# Patient Record
Sex: FEMALE | Race: WHITE | NOT HISPANIC OR LATINO | Employment: OTHER | URBAN - METROPOLITAN AREA
[De-identification: names, ages, dates, MRNs, and addresses within clinical notes are randomized per-mention and may not be internally consistent; named-entity substitution may affect disease eponyms.]

---

## 2017-09-20 ENCOUNTER — OFFICE VISIT (OUTPATIENT)
Dept: CARDIOLOGY | Facility: CLINIC | Age: 67
End: 2017-09-20

## 2017-09-20 VITALS
HEART RATE: 65 BPM | WEIGHT: 196 LBS | DIASTOLIC BLOOD PRESSURE: 90 MMHG | SYSTOLIC BLOOD PRESSURE: 162 MMHG | HEIGHT: 65 IN | BODY MASS INDEX: 32.65 KG/M2

## 2017-09-20 DIAGNOSIS — E78.2 MIXED HYPERLIPIDEMIA: ICD-10-CM

## 2017-09-20 DIAGNOSIS — I10 ESSENTIAL HYPERTENSION: ICD-10-CM

## 2017-09-20 DIAGNOSIS — I20.0 PROGRESSIVE ANGINA (HCC): Primary | ICD-10-CM

## 2017-09-20 PROCEDURE — 93000 ELECTROCARDIOGRAM COMPLETE: CPT | Performed by: INTERNAL MEDICINE

## 2017-09-20 PROCEDURE — 99204 OFFICE O/P NEW MOD 45 MIN: CPT | Performed by: INTERNAL MEDICINE

## 2017-09-20 RX ORDER — EZETIMIBE 10 MG/1
TABLET ORAL DAILY
Refills: 2 | COMMUNITY
Start: 2017-08-14 | End: 2017-09-20

## 2017-09-20 RX ORDER — LEVOTHYROXINE SODIUM 0.12 MG/1
112 TABLET ORAL DAILY
Refills: 3 | COMMUNITY
Start: 2017-08-24 | End: 2022-05-04 | Stop reason: SDDI

## 2017-09-20 RX ORDER — SIMVASTATIN 40 MG
TABLET ORAL DAILY
Refills: 1 | COMMUNITY
Start: 2017-07-07 | End: 2017-09-20

## 2017-09-20 RX ORDER — RAMIPRIL 10 MG/1
20 CAPSULE ORAL DAILY
Refills: 1 | COMMUNITY
Start: 2017-06-16

## 2017-09-20 RX ORDER — ATORVASTATIN CALCIUM 80 MG/1
80 TABLET, FILM COATED ORAL DAILY
Qty: 90 TABLET | Refills: 3 | Status: SHIPPED | OUTPATIENT
Start: 2017-09-20 | End: 2018-05-27 | Stop reason: SDUPTHER

## 2017-09-20 RX ORDER — METOPROLOL SUCCINATE 100 MG/1
100 TABLET, EXTENDED RELEASE ORAL DAILY
Refills: 1 | COMMUNITY
Start: 2017-07-07

## 2017-09-20 RX ORDER — CHOLECALCIFEROL (VITAMIN D3) 125 MCG
2000 CAPSULE ORAL DAILY
COMMUNITY

## 2017-09-20 RX ORDER — OMEPRAZOLE 20 MG/1
CAPSULE, DELAYED RELEASE ORAL DAILY
Refills: 3 | COMMUNITY
Start: 2017-06-28 | End: 2019-10-02

## 2017-09-20 RX ORDER — HYDROCHLOROTHIAZIDE 25 MG/1
25 TABLET ORAL DAILY
Refills: 1 | COMMUNITY
Start: 2017-06-16

## 2017-09-20 RX ORDER — AMOXICILLIN 500 MG
1200 CAPSULE ORAL DAILY
COMMUNITY

## 2017-09-20 RX ORDER — PAROXETINE HYDROCHLORIDE 40 MG/1
40 TABLET, FILM COATED ORAL EVERY MORNING
Refills: 1 | COMMUNITY
Start: 2017-08-23 | End: 2022-05-04

## 2017-09-20 RX ORDER — ASPIRIN 81 MG/1
81 TABLET ORAL DAILY
COMMUNITY

## 2017-09-20 NOTE — PROGRESS NOTES
Encounter Date:09/20/2017    Location: [place]    Juan Ramon Barroso MD    Patient ID: Cathi Walker is a 67 y.o. female; resident of [place], [place]    1950  Subjective:      Chief Complaint/Reason for visit:    Chief Complaint   Patient presents with   • Irregular Heart Beat     Consult   • Dizziness       Problem List:  1.  Chest pain   A.  Nuclear Stress Test 2014:  Normal per patient; data insufficient  2.  Mitral Valve Prolapse   A.  Echocardiogram 2014: data deficit  3.  Hypertension  4.  Hyperlipidemia  5.  Carotid Disease   A.  Carotid Duplex 2014, [place]:  1-49% stenosis on right; normal antegrade flow on left  6.  Thyroid Disorder  7.  GERD  8.  LENY with CPAP  9.  Surgeries:   A.  Cholecystectomy      HPI:  Patient is a pleasant 67-year-old white female with the above-noted medical history presents today in consultation for intermittent chest pain and dizziness.  She states that she has begun noticing over the past several months that when she is doing routine how sure such as mopping or sweeping the floors, that she develops left-sided chest pain that is intermittent in nature.  She states that it does go away with rest and last only for a few minutes.  There is no radiation of her pain and no other associated symptoms.  She also admits to intermittent dizziness that she attributes to rapid position changes.  She does have a history of mitral valve prolapse with palpitations but states that these have been under adequate control since being on a beta blocker.  She has a significantly strong family history of coronary artery disease with her father having his first MI in his 40s.  He is still living at the age of 88 and currently resides with her.  She has several other cardiac risk factors including hypertension, hyperlipidemia, and carotid disease diagnosed in 2014.  She also admits to having an increase in fatigue over the past several months.  She has sleep apnea and wears her CPAP faithfully  every night.  Patient does have a known history of hyperlipidemia currently being treated with simvastatin and 30.  Her current LDL was at 143 in June 2017.  Due to carotid disease and her strong family history of coronary disease, we feel that this should be treated more aggressively and will change her to a high dose statin therapy.    Cardiac ROS:  Positive for chest pain, shortness of breath, dyspnea on exertion, fatigue, palpitations, dizziness and snoring/ LENY with CPAP.  Negative for orthopnea, PND, edema, pre-syncope/ syncope    Cardiac Risk Factors: advanced age (older than 55 for men, 65 for women), dyslipidemia, hypertension, obesity (BMI >= 30 kg/m2), sedentary lifestyle and smoking/ tobacco exposure  Social History     Social History   • Marital status:      Spouse name: N/A   • Number of children: N/A   • Years of education: N/A     Occupational History   • Not on file.     Social History Main Topics   • Smoking status: Former Smoker     Quit date: 2000   • Smokeless tobacco: Never Used   • Alcohol use Not on file      Comment: occas   • Drug use: No   • Sexual activity: Defer     Other Topics Concern   • Not on file     Social History Narrative   • No narrative on file       family history includes Heart disease in her father; Heart failure in her father; Hyperlipidemia in her brother, father, mother, and sister; Hypertension in her brother, father, mother, and sister; Stroke in her mother.     has a past medical history of Acid reflux; Anxiety; Chicken pox; Hyperlipidemia; Hypertension; Measles; Menopause; and Mitral valve prolapse.    Allergies   Allergen Reactions   • Penicillins Hives         Current Outpatient Prescriptions:   •  aspirin 81 MG EC tablet, Take 81 mg by mouth Daily., Disp: , Rfl:   •  Cholecalciferol (VITAMIN D3) 2000 units tablet, Take  by mouth Daily., Disp: , Rfl:   •  GLUCOSAMINE SULFATE PO, Take 2,000 mg by mouth Daily., Disp: , Rfl:   •  hydrochlorothiazide  (HYDRODIURIL) 25 MG tablet, Daily., Disp: , Rfl: 1  •  levothyroxine (SYNTHROID, LEVOTHROID) 100 MCG tablet, Daily., Disp: , Rfl: 3  •  metoprolol succinate XL (TOPROL-XL) 50 MG 24 hr tablet, Daily., Disp: , Rfl: 1  •  Omega-3 Fatty Acids (FISH OIL) 1200 MG capsule capsule, Take 1,200 mg by mouth Daily., Disp: , Rfl:   •  omeprazole (priLOSEC) 20 MG capsule, Daily., Disp: , Rfl: 3  •  PARoxetine (PAXIL) 20 MG tablet, Daily., Disp: , Rfl: 1  •  ramipril (ALTACE) 10 MG capsule, Daily., Disp: , Rfl: 1  •  ZETIA 10 MG tablet, Daily., Disp: , Rfl: 2  •  atorvastatin (LIPITOR) 80 MG tablet, Take 1 tablet by mouth Daily., Disp: 90 tablet, Rfl: 3    Review of Systems   Constitution: Positive for malaise/fatigue.   HENT: Negative.    Eyes: Negative.         Wears glasses   Cardiovascular: Positive for chest pain, dyspnea on exertion and palpitations. Negative for near-syncope, orthopnea, paroxysmal nocturnal dyspnea and syncope.   Respiratory: Positive for sleep disturbances due to breathing and snoring. Negative for cough, hemoptysis, shortness of breath, sputum production and wheezing.    Endocrine: Negative.    Hematologic/Lymphatic: Negative.    Skin: Negative.    Musculoskeletal: Negative.    Gastrointestinal: Positive for heartburn.   Genitourinary: Negative.    Neurological: Positive for dizziness (intermittent with rapid postion changes).   Psychiatric/Behavioral: Negative.    Allergic/Immunologic: Negative.        Vitals:    09/20/17 0934   BP: 162/90   Pulse: 65          Objective:       Physical Exam   Constitutional: She is oriented to person, place, and time. She appears well-developed and well-nourished.   HENT:   Head: Normocephalic and atraumatic.   Eyes: Right eye exhibits no discharge. Left eye exhibits no discharge.   Neck: Normal range of motion. Neck supple. No JVD present.   Cardiovascular: Normal rate, regular rhythm, normal heart sounds and intact distal pulses.  Exam reveals no gallop and no friction  rub.    No murmur heard.  Pulmonary/Chest: Effort normal and breath sounds normal. She has no wheezes.   Abdominal: Soft. Bowel sounds are normal. There is no tenderness.   Musculoskeletal: Normal range of motion. She exhibits no edema.   Neurological: She is alert and oriented to person, place, and time.   Skin: Skin is warm and dry.   Psychiatric: She has a normal mood and affect. Her behavior is normal.       Data Review:     ECG 12 Lead  Date/Time: 9/20/2017 10:21 AM  Performed by: ZOFIA GRIFFITH  Authorized by: ZOFIA GRIFFITH   Rhythm: sinus rhythm  Rate: normal  BPM: 80  Clinical impression: normal ECG        Assessment:      ICD-10-CM ICD-9-CM   1. Progressive angina I20.0 411.1   2. Mixed hyperlipidemia E78.2 272.2   3. Essential hypertension I10 401.9       Plan:  1.  Stress Echocardiogram  2.  Discontinue simvastatin and zetia; switch to Lipitor 80mg daily  3.  Recheck labs in 3 months  4.  Continue all other medications as directed  5.  F/up in 12  months or sooner if needed.        Scribed for Emilie Clarke MD by RAYRAY Crowe. 9/20/2017  1:05 PM     I Emilie Clarke MD personally performed the services described in this documentation as scribed by the above individual in my presence, and it is both accurate and complete.    Emilie Clarke MD, [facility]

## 2017-09-27 ENCOUNTER — HOSPITAL ENCOUNTER (OUTPATIENT)
Dept: CARDIOLOGY | Facility: HOSPITAL | Age: 67
Discharge: HOME | End: 2017-09-27
Admitting: NURSE PRACTITIONER

## 2017-09-27 VITALS — BODY MASS INDEX: 32.65 KG/M2 | HEIGHT: 65 IN | WEIGHT: 196 LBS | HEART RATE: 79 BPM

## 2017-09-27 DIAGNOSIS — I20.0 PROGRESSIVE ANGINA (HCC): ICD-10-CM

## 2017-09-27 PROCEDURE — 93352 ADMIN ECG CONTRAST AGENT: CPT | Performed by: INTERNAL MEDICINE

## 2017-09-27 PROCEDURE — 25010000002 SULFUR HEXAFLUORIDE MICROSPH 60.7-25 MG RECONSTITUTED SUSPENSION

## 2017-09-27 PROCEDURE — 93325 DOPPLER ECHO COLOR FLOW MAPG: CPT | Performed by: INTERNAL MEDICINE

## 2017-09-27 PROCEDURE — 93320 DOPPLER ECHO COMPLETE: CPT

## 2017-09-27 PROCEDURE — 93350 STRESS TTE ONLY: CPT | Performed by: INTERNAL MEDICINE

## 2017-09-27 PROCEDURE — 93018 CV STRESS TEST I&R ONLY: CPT | Performed by: INTERNAL MEDICINE

## 2017-09-27 PROCEDURE — 25010000002 SULFUR HEXAFLUORIDE MICROSPH 60.7-25 MG RECONSTITUTED SUSPENSION: Performed by: NURSE PRACTITIONER

## 2017-09-27 PROCEDURE — 93325 DOPPLER ECHO COLOR FLOW MAPG: CPT

## 2017-09-27 PROCEDURE — 93350 STRESS TTE ONLY: CPT

## 2017-09-27 PROCEDURE — 93320 DOPPLER ECHO COMPLETE: CPT | Performed by: INTERNAL MEDICINE

## 2017-09-27 PROCEDURE — 93017 CV STRESS TEST TRACING ONLY: CPT

## 2017-09-27 RX ADMIN — SULFUR HEXAFLUORIDE 4 ML: KIT at 14:02

## 2017-10-02 LAB
BH CV ECHO MEAS - AO MAX PG (FULL): 3.2 MMHG
BH CV ECHO MEAS - AO MAX PG: 8.1 MMHG
BH CV ECHO MEAS - AO MEAN PG (FULL): 2 MMHG
BH CV ECHO MEAS - AO MEAN PG: 5 MMHG
BH CV ECHO MEAS - AO ROOT AREA (BSA CORRECTED): 1.4
BH CV ECHO MEAS - AO ROOT AREA: 5.9 CM^2
BH CV ECHO MEAS - AO ROOT DIAM: 2.8 CM
BH CV ECHO MEAS - AO V2 MAX: 142 CM/SEC
BH CV ECHO MEAS - AO V2 MEAN: 101 CM/SEC
BH CV ECHO MEAS - AO V2 VTI: 34.4 CM
BH CV ECHO MEAS - ASC AORTA: 3 CM
BH CV ECHO MEAS - AVA(I,A): 2.2 CM^2
BH CV ECHO MEAS - AVA(I,D): 2.2 CM^2
BH CV ECHO MEAS - AVA(V,A): 2.4 CM^2
BH CV ECHO MEAS - AVA(V,D): 2.4 CM^2
BH CV ECHO MEAS - BSA(HAYCOCK): 2.1 M^2
BH CV ECHO MEAS - BSA: 2 M^2
BH CV ECHO MEAS - BZI_BMI: 32.6 KILOGRAMS/M^2
BH CV ECHO MEAS - BZI_METRIC_HEIGHT: 165.1 CM
BH CV ECHO MEAS - BZI_METRIC_WEIGHT: 88.9 KG
BH CV ECHO MEAS - CONTRAST EF 4CH: 67.3 ML/M^2
BH CV ECHO MEAS - EDV(CUBED): 87.5 ML
BH CV ECHO MEAS - EDV(MOD-SP4): 52 ML
BH CV ECHO MEAS - EDV(TEICH): 89.6 ML
BH CV ECHO MEAS - EF(CUBED): 80.8 %
BH CV ECHO MEAS - EF(MOD-SP4): 67.3 %
BH CV ECHO MEAS - EF(TEICH): 73.6 %
BH CV ECHO MEAS - ESV(CUBED): 16.8 ML
BH CV ECHO MEAS - ESV(MOD-SP4): 17 ML
BH CV ECHO MEAS - ESV(TEICH): 23.7 ML
BH CV ECHO MEAS - FS: 42.3 %
BH CV ECHO MEAS - IVS/LVPW: 0.65
BH CV ECHO MEAS - IVSD: 0.73 CM
BH CV ECHO MEAS - LA DIMENSION: 3.6 CM
BH CV ECHO MEAS - LA/AO: 1.3
BH CV ECHO MEAS - LAT PEAK E' VEL: 7.4 CM/SEC
BH CV ECHO MEAS - LV DIASTOLIC VOL/BSA (35-75): 26.5 ML/M^2
BH CV ECHO MEAS - LV MASS(C)D: 134.6 GRAMS
BH CV ECHO MEAS - LV MASS(C)DI: 68.6 GRAMS/M^2
BH CV ECHO MEAS - LV MAX PG: 4.8 MMHG
BH CV ECHO MEAS - LV MEAN PG: 3 MMHG
BH CV ECHO MEAS - LV SYSTOLIC VOL/BSA (12-30): 8.7 ML/M^2
BH CV ECHO MEAS - LV V1 MAX: 110 CM/SEC
BH CV ECHO MEAS - LV V1 MEAN: 79.1 CM/SEC
BH CV ECHO MEAS - LV V1 VTI: 23.6 CM
BH CV ECHO MEAS - LVIDD: 4.4 CM
BH CV ECHO MEAS - LVIDS: 2.6 CM
BH CV ECHO MEAS - LVLD AP4: 6.9 CM
BH CV ECHO MEAS - LVLS AP4: 5.9 CM
BH CV ECHO MEAS - LVOT AREA (M): 3.1 CM^2
BH CV ECHO MEAS - LVOT AREA: 3.1 CM^2
BH CV ECHO MEAS - LVOT DIAM: 2 CM
BH CV ECHO MEAS - LVPWD: 1 CM
BH CV ECHO MEAS - MED PEAK E' VEL: 5.92 CM/SEC
BH CV ECHO MEAS - MV A MAX VEL: 85.4 CM/SEC
BH CV ECHO MEAS - MV DEC TIME: 0.13 SEC
BH CV ECHO MEAS - MV E MAX VEL: 78 CM/SEC
BH CV ECHO MEAS - MV E/A: 0.91
BH CV ECHO MEAS - PA ACC SLOPE: 741.5 CM/SEC^2
BH CV ECHO MEAS - PA ACC TIME: 0.12 SEC
BH CV ECHO MEAS - PA MAX PG: 5 MMHG
BH CV ECHO MEAS - PA PR(ACCEL): 24.3 MMHG
BH CV ECHO MEAS - PA V2 MAX: 111 CM/SEC
BH CV ECHO MEAS - RVDD: 2.2 CM
BH CV ECHO MEAS - SI(AO): 104.2 ML/M^2
BH CV ECHO MEAS - SI(CUBED): 36.1 ML/M^2
BH CV ECHO MEAS - SI(LVOT): 37.8 ML/M^2
BH CV ECHO MEAS - SI(MOD-SP4): 17.8 ML/M^2
BH CV ECHO MEAS - SI(TEICH): 33.6 ML/M^2
BH CV ECHO MEAS - SV(AO): 204.3 ML
BH CV ECHO MEAS - SV(CUBED): 70.8 ML
BH CV ECHO MEAS - SV(LVOT): 74.1 ML
BH CV ECHO MEAS - SV(MOD-SP4): 35 ML
BH CV ECHO MEAS - SV(TEICH): 65.9 ML
BH CV ECHO MEAS - TAPSE (>1.6): 2.2 CM2
BH CV ECHO MEAS - TV MAX PG: 1.5 MMHG
BH CV ECHO MEAS - TV V2 MAX: 61.2 CM/SEC
BH CV STRESS BP STAGE 1: NORMAL
BH CV STRESS BP STAGE 2: NORMAL
BH CV STRESS COMMENTS STAGE 1: NORMAL
BH CV STRESS DURATION MIN STAGE 1: 3
BH CV STRESS DURATION MIN STAGE 2: 3
BH CV STRESS DURATION MIN STAGE 3: 1
BH CV STRESS DURATION SEC STAGE 1: 0
BH CV STRESS DURATION SEC STAGE 2: 0
BH CV STRESS DURATION SEC STAGE 3: 9
BH CV STRESS GRADE STAGE 1: 10
BH CV STRESS GRADE STAGE 2: 12
BH CV STRESS GRADE STAGE 3: 14
BH CV STRESS HR STAGE 1: 134
BH CV STRESS HR STAGE 2: 155
BH CV STRESS HR STAGE 3: 162
BH CV STRESS METS STAGE 1: 5
BH CV STRESS METS STAGE 2: 7.5
BH CV STRESS METS STAGE 3: 10
BH CV STRESS PROTOCOL 1: NORMAL
BH CV STRESS RECOVERY BP: NORMAL MMHG
BH CV STRESS RECOVERY HR: 97 BPM
BH CV STRESS SPEED STAGE 1: 1.7
BH CV STRESS SPEED STAGE 2: 2.5
BH CV STRESS SPEED STAGE 3: 3.4
BH CV STRESS STAGE 1: 1
BH CV STRESS STAGE 2: 2
BH CV STRESS STAGE 3: 3
BH CV XLRA - RV BASE: 2.6 CM
BH CV XLRA - RV LENGTH: 5.4 CM
BH CV XLRA - RV MID: 2.4 CM
LV EF 2D ECHO EST: 60 %
MAXIMAL PREDICTED HEART RATE: 155 BPM
PERCENT MAX PREDICTED HR: 104.52 %
STRESS BASELINE BP: NORMAL MMHG
STRESS BASELINE HR: 85 BPM
STRESS PERCENT HR: 123 %
STRESS POST ESTIMATED WORKLOAD: 10.1 METS
STRESS POST EXERCISE DUR MIN: 7 MIN
STRESS POST EXERCISE DUR SEC: 9 SEC
STRESS POST PEAK BP: NORMAL MMHG
STRESS POST PEAK HR: 162 BPM
STRESS TARGET HR: 132 BPM

## 2018-05-29 RX ORDER — ATORVASTATIN CALCIUM 80 MG/1
80 TABLET, FILM COATED ORAL DAILY
Qty: 90 TABLET | Refills: 0 | Status: SHIPPED | OUTPATIENT
Start: 2018-05-29 | End: 2018-11-15 | Stop reason: SDUPTHER

## 2018-09-26 ENCOUNTER — OFFICE VISIT (OUTPATIENT)
Dept: CARDIOLOGY | Facility: CLINIC | Age: 68
End: 2018-09-26

## 2018-09-26 VITALS
SYSTOLIC BLOOD PRESSURE: 134 MMHG | HEIGHT: 65 IN | WEIGHT: 201 LBS | HEART RATE: 74 BPM | BODY MASS INDEX: 33.49 KG/M2 | DIASTOLIC BLOOD PRESSURE: 72 MMHG

## 2018-09-26 DIAGNOSIS — I10 ESSENTIAL HYPERTENSION: Primary | ICD-10-CM

## 2018-09-26 DIAGNOSIS — E78.2 MIXED HYPERLIPIDEMIA: ICD-10-CM

## 2018-09-26 PROCEDURE — 99213 OFFICE O/P EST LOW 20 MIN: CPT | Performed by: NURSE PRACTITIONER

## 2018-09-26 RX ORDER — DOXYCYCLINE HYCLATE 100 MG/1
100 CAPSULE ORAL 2 TIMES DAILY
COMMUNITY
End: 2019-10-02

## 2018-09-26 NOTE — PROGRESS NOTES
Cathi MIRAMONTES Denise  1950  [phone]      09/26/2018    Juan Ramon Barroso MD    Chief Complaint   Patient presents with   • Progressive angina (CMS/HCC)     Problem List  1.  Chest pain              A.  Nuclear Stress Test 2014:  Normal per patient; data insufficient   B.  Stress Echo 10/2/17:  EF 60%, trace MR/ TR.  Good exercise tolerance with borderline hypertensive response @ 200/80.  No ischemica.    2.  Mitral Valve Prolapse              A.  Echocardiogram 2014: data deficit  3.  Hypertension  4.  Hyperlipidemia  5.  Carotid Disease              A.  Carotid Duplex 2014, [place]:  1-49% stenosis on right; normal antegrade flow on left  6.  Thyroid Disorder  7.  GERD  8.  LENY with CPAP  9.  Surgeries:              A.  Cholecystectomy.    Allergies   Allergen Reactions   • Penicillins Hives       Current Medications    Current Outpatient Prescriptions:   •  aspirin 81 MG EC tablet, Take 81 mg by mouth Daily., Disp: , Rfl:   •  atorvastatin (LIPITOR) 80 MG tablet, Take 1 tablet by mouth Daily. Need lab work for further refills, Disp: 90 tablet, Rfl: 0  •  Cholecalciferol (VITAMIN D3) 2000 units tablet, Take 2,000 Units by mouth Daily., Disp: , Rfl:   •  doxycycline (VIBRAMYCIN) 100 MG capsule, Take 100 mg by mouth 2 (Two) Times a Day., Disp: , Rfl:   •  hydrochlorothiazide (HYDRODIURIL) 25 MG tablet, Take 25 mg by mouth Daily., Disp: , Rfl: 1  •  levothyroxine (SYNTHROID, LEVOTHROID) 100 MCG tablet, Daily., Disp: , Rfl: 3  •  metoprolol succinate XL (TOPROL-XL) 100 MG 24 hr tablet, Take 100 mg by mouth Daily., Disp: , Rfl: 1  •  Omega-3 Fatty Acids (FISH OIL) 1200 MG capsule capsule, Take 1,200 mg by mouth Daily., Disp: , Rfl:   •  omeprazole (priLOSEC) 20 MG capsule, Daily., Disp: , Rfl: 3  •  PARoxetine (PAXIL) 20 MG tablet, Take 20 mg by mouth As Needed., Disp: , Rfl: 1  •  ramipril (ALTACE) 10 MG capsule, Take 20 mg by mouth Daily., Disp: , Rfl: 1    History of Present Illness   HPI  Patient is a very  "pleasant 68-year-old female with the above-noted medical history presents for 6 month follow-up status post chest pain, hyperlipidemia and hypertension.  Since her last visit she has had a normal stress echocardiogram.  She is trying to remain active with enjoying spending time with her 5 grandchildren.  She is hoping to get back involved in a routine exercise regimen.  She denies having any current chest pain, shortness of breath, dyspnea on exertion, edema, fatigue, palpitations, dizziness and syncope.  She is scheduled to have her upcoming annual follow-up with her primary care physician and will have her lipids checked at that time.  She is going to monitor her blood pressures at home and keep a long period she has noticed that her blood pressure continues to trend upward gradually and is wondering if the medication regimen that she is currently taking as appropriate.  I'll was told her that there are options that we can try to condense the amount of medications that she currently is taking.  She also wonders if her recent weight gain is admitted with her blood pressures as well.  Overall, she is doing well from the cardiac standpoint.      The following portions of the patient's history were reviewed and updated as appropriate: allergies, current medications and problem list.    Pertinent positives as listed in the HPI.  All other systems reviewed are negative.    Vitals:    09/26/18 1453   BP: 134/72   BP Location: Right arm   Patient Position: Sitting   Pulse: 74   Weight: 91.2 kg (201 lb)   Height: 165.1 cm (65\")       Physical Exam  GENERAL: well-developed, well-nourished; in no acute distress.   NECK:  There is no jugular venous distention at 30°.  Carotid upstrokes are 2+ and  symmetrical without bruits.   LUNGS: Clear to auscultation bilaterally without wheezing, rhonchi, or rales noted.   CARDIOVASCULAR: The heart has a regular rate with a normal S1 and S2. There is no murmur, gallop, rub, or click " appreciated. The PMI is nondisplaced.   ABDOMEN: Soft and nontender  NEUROLOGICAL: Nonfocal; Alert and oriented  PERIPHERAL VASCULAR:  Posterior tibial pulses are 2+ and symmetrical. There is no peripheral edema.   SKIN:  Warm and dry  PSYCHIATRIC: normal mood and affect; behavior appropriate    Diagnostic Data:  Procedures    Assessment:      ICD-10-CM ICD-9-CM   1. Essential hypertension I10 401.9   2. Mixed hyperlipidemia E78.2 272.2       Plan:  Encouraged routine exercise and dietary modifications  Continue to monitor blood pressures and keep log  Continue current medications at this time  F/up in 12 months or sooner if needed.      Seen independently by RAYRAY Crowe on September 26, 2018 @ 5099

## 2018-11-15 RX ORDER — ATORVASTATIN CALCIUM 80 MG/1
80 TABLET, FILM COATED ORAL DAILY
Qty: 90 TABLET | Refills: 0 | Status: SHIPPED | OUTPATIENT
Start: 2018-11-15 | End: 2019-02-10 | Stop reason: SDUPTHER

## 2019-02-11 RX ORDER — ATORVASTATIN CALCIUM 80 MG/1
TABLET, FILM COATED ORAL
Qty: 90 TABLET | Refills: 2 | Status: SHIPPED | OUTPATIENT
Start: 2019-02-11 | End: 2019-12-06 | Stop reason: SDUPTHER

## 2019-10-02 ENCOUNTER — OFFICE VISIT (OUTPATIENT)
Dept: CARDIOLOGY | Facility: CLINIC | Age: 69
End: 2019-10-02

## 2019-10-02 VITALS
DIASTOLIC BLOOD PRESSURE: 64 MMHG | SYSTOLIC BLOOD PRESSURE: 124 MMHG | BODY MASS INDEX: 35.34 KG/M2 | WEIGHT: 207 LBS | HEIGHT: 64 IN | HEART RATE: 70 BPM

## 2019-10-02 DIAGNOSIS — E78.2 MIXED HYPERLIPIDEMIA: ICD-10-CM

## 2019-10-02 DIAGNOSIS — I65.21 STENOSIS OF RIGHT CAROTID ARTERY: ICD-10-CM

## 2019-10-02 DIAGNOSIS — I10 ESSENTIAL HYPERTENSION: Primary | ICD-10-CM

## 2019-10-02 PROCEDURE — 99213 OFFICE O/P EST LOW 20 MIN: CPT | Performed by: INTERNAL MEDICINE

## 2019-12-06 RX ORDER — ATORVASTATIN CALCIUM 80 MG/1
80 TABLET, FILM COATED ORAL DAILY
Qty: 90 TABLET | Refills: 0 | Status: SHIPPED | OUTPATIENT
Start: 2019-12-06 | End: 2020-03-05

## 2020-01-03 DIAGNOSIS — E78.2 MIXED HYPERLIPIDEMIA: Primary | ICD-10-CM

## 2020-03-05 RX ORDER — ATORVASTATIN CALCIUM 80 MG/1
TABLET, FILM COATED ORAL
Qty: 90 TABLET | Refills: 0 | Status: SHIPPED | OUTPATIENT
Start: 2020-03-05 | End: 2020-06-01

## 2020-06-01 RX ORDER — ATORVASTATIN CALCIUM 80 MG/1
80 TABLET, FILM COATED ORAL DAILY
Qty: 90 TABLET | Refills: 0 | Status: SHIPPED | OUTPATIENT
Start: 2020-06-01

## 2020-07-02 DIAGNOSIS — I65.21 STENOSIS OF RIGHT CAROTID ARTERY: Primary | ICD-10-CM

## 2020-10-08 ENCOUNTER — HOSPITAL ENCOUNTER (OUTPATIENT)
Dept: CARDIOLOGY | Facility: HOSPITAL | Age: 70
Discharge: HOME | End: 2020-10-08
Admitting: INTERNAL MEDICINE

## 2020-10-08 VITALS — BODY MASS INDEX: 36.68 KG/M2 | WEIGHT: 207 LBS | HEIGHT: 63 IN

## 2020-10-08 DIAGNOSIS — I65.21 STENOSIS OF RIGHT CAROTID ARTERY: ICD-10-CM

## 2020-10-08 PROCEDURE — 93880 EXTRACRANIAL BILAT STUDY: CPT

## 2020-10-08 PROCEDURE — 93880 EXTRACRANIAL BILAT STUDY: CPT | Performed by: INTERNAL MEDICINE

## 2020-10-12 LAB
BH CV ECHO MEAS - BSA(HAYCOCK): 2.1 M^2
BH CV ECHO MEAS - BSA: 2 M^2
BH CV ECHO MEAS - BZI_BMI: 36.7 KILOGRAMS/M^2
BH CV ECHO MEAS - BZI_METRIC_HEIGHT: 160 CM
BH CV ECHO MEAS - BZI_METRIC_WEIGHT: 93.9 KG
BH CV XLRA MEAS LEFT CCA RATIO VEL: 88.2 CM/SEC
BH CV XLRA MEAS LEFT DIST CCA EDV: 26.5 CM/SEC
BH CV XLRA MEAS LEFT DIST CCA PSV: 78.9 CM/SEC
BH CV XLRA MEAS LEFT DIST ICA EDV: 23.6 CM/SEC
BH CV XLRA MEAS LEFT DIST ICA PSV: 63.8 CM/SEC
BH CV XLRA MEAS LEFT ICA RATIO VEL: 87.7 CM/SEC
BH CV XLRA MEAS LEFT ICA/CCA RATIO: 0.99
BH CV XLRA MEAS LEFT MID CCA EDV: 23.7 CM/SEC
BH CV XLRA MEAS LEFT MID CCA PSV: 88.8 CM/SEC
BH CV XLRA MEAS LEFT MID ICA EDV: 26 CM/SEC
BH CV XLRA MEAS LEFT MID ICA PSV: 72.2 CM/SEC
BH CV XLRA MEAS LEFT PROX CCA EDV: 19.9 CM/SEC
BH CV XLRA MEAS LEFT PROX CCA PSV: 78.9 CM/SEC
BH CV XLRA MEAS LEFT PROX ECA PSV: 93.7 CM/SEC
BH CV XLRA MEAS LEFT PROX ICA EDV: 27 CM/SEC
BH CV XLRA MEAS LEFT PROX ICA PSV: 88.2 CM/SEC
BH CV XLRA MEAS LEFT VERTEBRAL A EDV: 8.3 CM/SEC
BH CV XLRA MEAS LEFT VERTEBRAL A PSV: 41 CM/SEC
BH CV XLRA MEAS RIGHT CCA RATIO VEL: 78.6 CM/SEC
BH CV XLRA MEAS RIGHT DIST CCA EDV: 19.6 CM/SEC
BH CV XLRA MEAS RIGHT DIST CCA PSV: 79.6 CM/SEC
BH CV XLRA MEAS RIGHT DIST ICA EDV: 23.1 CM/SEC
BH CV XLRA MEAS RIGHT DIST ICA PSV: 74.2 CM/SEC
BH CV XLRA MEAS RIGHT ICA RATIO VEL: 90.4 CM/SEC
BH CV XLRA MEAS RIGHT ICA/CCA RATIO: 1.2
BH CV XLRA MEAS RIGHT MID CCA EDV: 16.7 CM/SEC
BH CV XLRA MEAS RIGHT MID CCA PSV: 79.1 CM/SEC
BH CV XLRA MEAS RIGHT MID ICA EDV: 31.4 CM/SEC
BH CV XLRA MEAS RIGHT MID ICA PSV: 90.8 CM/SEC
BH CV XLRA MEAS RIGHT PROX CCA EDV: 18.7 CM/SEC
BH CV XLRA MEAS RIGHT PROX CCA PSV: 88.4 CM/SEC
BH CV XLRA MEAS RIGHT PROX ECA PSV: 95.8 CM/SEC
BH CV XLRA MEAS RIGHT PROX ICA EDV: 17.7 CM/SEC
BH CV XLRA MEAS RIGHT PROX ICA PSV: 76.1 CM/SEC
BH CV XLRA MEAS RIGHT PROX SCLA PSV: 139.1 CM/SEC
BH CV XLRA MEAS RIGHT VERTEBRAL A EDV: 10.8 CM/SEC
BH CV XLRA MEAS RIGHT VERTEBRAL A PSV: 46.7 CM/SEC
LEFT ARM BP: NORMAL MMHG
RIGHT ARM BP: NORMAL MMHG

## 2020-10-20 ENCOUNTER — OFFICE VISIT (OUTPATIENT)
Dept: CARDIOLOGY | Facility: CLINIC | Age: 70
End: 2020-10-20

## 2020-10-20 VITALS
DIASTOLIC BLOOD PRESSURE: 72 MMHG | BODY MASS INDEX: 32.49 KG/M2 | TEMPERATURE: 96.4 F | HEIGHT: 65 IN | OXYGEN SATURATION: 98 % | HEART RATE: 67 BPM | WEIGHT: 195 LBS | SYSTOLIC BLOOD PRESSURE: 148 MMHG

## 2020-10-20 DIAGNOSIS — I10 ESSENTIAL HYPERTENSION: Primary | ICD-10-CM

## 2020-10-20 DIAGNOSIS — R06.09 DOE (DYSPNEA ON EXERTION): ICD-10-CM

## 2020-10-20 DIAGNOSIS — E78.2 MIXED HYPERLIPIDEMIA: ICD-10-CM

## 2020-10-20 DIAGNOSIS — I65.21 STENOSIS OF RIGHT CAROTID ARTERY: ICD-10-CM

## 2020-10-20 PROCEDURE — 99214 OFFICE O/P EST MOD 30 MIN: CPT | Performed by: NURSE PRACTITIONER

## 2020-10-20 RX ORDER — CALCIUM POLYCARBOPHIL 625 MG
625 TABLET ORAL DAILY
COMMUNITY
End: 2022-05-04

## 2020-10-20 RX ORDER — PHENOL 1.4 %
10 AEROSOL, SPRAY (ML) MUCOUS MEMBRANE NIGHTLY
COMMUNITY
End: 2022-05-04

## 2020-10-20 NOTE — PROGRESS NOTES
[facility]    Patient ID: Cathi Walker is a 70 y.o. female.  : 1950   Contact: [phone]    Encounter date: 10/20/2020    PCP: Juan Ramon Barroso MD      Chief complaint:   Chief Complaint   Patient presents with   • Essential Hypertension   • Progressive Angina     PROBLEM LIST:  1. Chest pain  a. Nuclear Stress Test 2014:  Normal per patient; data insufficient  b.  Stress Echo 10/2/17:  EF 60%, trace MR/ TR.  Good exercise tolerance with borderline hypertensive response @ 200/80.  No ischemia.    2.  History of mitral Valve Prolapse  a.  Echocardiogram : data deficit  b.  Stress Echo 10/2/17:  EF 60%, trace MR/ TR.  Good exercise tolerance with borderline hypertensive response @ 200/80.  No ischemica.  No mention of mitral valve prolapse.   3.  Hypertension  4.  Hyperlipidemia  5.  Carotid Disease  a. Carotid Duplex , [place]:  1-49% stenosis on right; normal antegrade flow   b. Carotid duplex 10/2020: ABILIO 0-49%, LICA 0-49%, mild bilateral carotid atherosclerosis.   6. Thyroid Disorder  7.   GERD  8.   LENY with CPAP  9.   Surgeries:  a.  Cholecystectomy.    Allergies   Allergen Reactions   • Penicillins Hives       Current Medications:    Current Outpatient Medications:   •  aspirin 81 MG EC tablet, Take 81 mg by mouth Daily., Disp: , Rfl:   •  atorvastatin (LIPITOR) 80 MG tablet, Take 1 tablet by mouth Daily. Need lab work- will mail orders, Disp: 90 tablet, Rfl: 0  •  Calcium Carbonate (CALCIUM 600 PO), Take 600 mg by mouth 2 (Two) Times a Day., Disp: , Rfl:   •  Cholecalciferol (VITAMIN D3) 2000 units tablet, Take 2,000 Units by mouth Daily., Disp: , Rfl:   •  hydrochlorothiazide (HYDRODIURIL) 25 MG tablet, Take 25 mg by mouth Daily., Disp: , Rfl: 1  •  levothyroxine (SYNTHROID, LEVOTHROID) 125 MCG tablet, Take 125 mcg by mouth Daily., Disp: , Rfl: 3  •  Melatonin 10 MG tablet, Take 10 mg by mouth Every Night., Disp: , Rfl:   •  metoprolol succinate  "XL (TOPROL-XL) 100 MG 24 hr tablet, Take 100 mg by mouth Daily., Disp: , Rfl: 1  •  Omega-3 Fatty Acids (FISH OIL) 1200 MG capsule capsule, Take 1,200 mg by mouth Daily., Disp: , Rfl:   •  PARoxetine (PAXIL) 40 MG tablet, Take 40 mg by mouth Every Morning., Disp: , Rfl: 1  •  polycarbophil (calcium polycarbophil) 625 MG tablet tablet, Take 625 mg by mouth Daily., Disp: , Rfl:   •  ramipril (ALTACE) 10 MG capsule, Take 20 mg by mouth Daily., Disp: , Rfl: 1    HPI    Cathi Walker is a 70 y.o. female who presents today for follow up on HTN, HLD, carotid artery disease. Since last visit, patient reports feeling a little more SOB with activity. She has been trying to walk about 2 miles per day. SOB does not seem to limit activity. She reports occasional chest pain which lasts for only a couple of seconds. No PND, orthopnea, DARYN. BP was elevated when she had her carotid ultrasound. She has not been checking it at home.       The following portions of the patient's history were reviewed and updated as appropriate: allergies, current medications and problem list.    Pertinent positives as listed in the HPI.  All other systems reviewed are negative.         Vitals:    10/20/20 1030   BP: 148/72   BP Location: Left arm   Patient Position: Sitting   Pulse: 67   Temp: 96.4 °F (35.8 °C)   SpO2: 98%   Weight: 88.5 kg (195 lb)   Height: 165.1 cm (65\")       Physical Exam:  General: Alert and oriented.  Neck: Jugular venous pressure is within normal limits. Carotids have normal upstrokes without bruits.   Cardiovascular: Heart has a nondisplaced focal PMI. Regular rate and rhythm without murmur, gallop or rub.  Lungs: Clear without rales or wheezes. Equal expansion is noted.   Extremities: Show no edema.  Skin: Warm and dry.  Neurologic: Nonfocal.       Procedures      ASSESSMENT:    ICD-10-CM ICD-9-CM   1. Essential hypertension  I10 401.9   2. Mixed hyperlipidemia  E78.2 272.2   3. Stenosis of right carotid artery  I65.21 " 433.10   4. CHAMBERS (dyspnea on exertion), worsened  R06.00 786.09     Lab results found above were reviewed with the patient.      PLAN:  1. Continue rampril, metoprolol and HCTZ for HTN. Patient will start checking BP daily and call us in a week if running high.   2. Reviewed recent FLP which is acceptable. Continue statin for HLD and carotid artery disease.   3. Reviewed recent carotid artery duplex results and discussed with patient  4. Continue ASA for cardiovascular risks and carotid artery plaque.   5. Patient was counseled to begin aerobic exercise 30 min per day for at least 4 days per week.   6. Will get stress echocardiogram for worsening shortness of breath/CHAMBERS.   7. Continue all other current medications.  8. F/up in 6 months, sooner if needed.      Electronically signed by RAYRAY Adame, 10/20/20, 11:05 AM EDT.

## 2020-10-26 NOTE — TELEPHONE ENCOUNTER
PT saw you last week and was instructed to call back with BP readings-    BP still running high- 180's/70's through the past week     HCTZ 25 mg Daily  Toprol  mg Daily  Ramipril 20 mg Daily

## 2020-10-26 NOTE — TELEPHONE ENCOUNTER
Discussed with pt- made aware that edema is most common side affect- she will update me in 7-10 days or sooner if needed

## 2020-10-27 RX ORDER — AMLODIPINE BESYLATE 5 MG/1
5 TABLET ORAL DAILY
Qty: 30 TABLET | Refills: 3 | Status: SHIPPED | OUTPATIENT
Start: 2020-10-27 | End: 2021-01-04

## 2020-11-09 ENCOUNTER — APPOINTMENT (OUTPATIENT)
Dept: PREADMISSION TESTING | Facility: HOSPITAL | Age: 70
End: 2020-11-09

## 2020-11-09 PROCEDURE — C9803 HOPD COVID-19 SPEC COLLECT: HCPCS

## 2020-11-09 PROCEDURE — U0004 COV-19 TEST NON-CDC HGH THRU: HCPCS

## 2020-11-10 LAB — SARS-COV-2 RNA RESP QL NAA+PROBE: NOT DETECTED

## 2020-11-11 ENCOUNTER — HOSPITAL ENCOUNTER (OUTPATIENT)
Dept: CARDIOLOGY | Facility: HOSPITAL | Age: 70
Discharge: HOME | End: 2020-11-11
Admitting: NURSE PRACTITIONER

## 2020-11-11 VITALS — HEIGHT: 65 IN | BODY MASS INDEX: 32.49 KG/M2 | WEIGHT: 195 LBS

## 2020-11-11 DIAGNOSIS — R06.09 DOE (DYSPNEA ON EXERTION): ICD-10-CM

## 2020-11-11 PROCEDURE — 93018 CV STRESS TEST I&R ONLY: CPT | Performed by: INTERNAL MEDICINE

## 2020-11-11 PROCEDURE — 93352 ADMIN ECG CONTRAST AGENT: CPT | Performed by: INTERNAL MEDICINE

## 2020-11-11 PROCEDURE — 25010000002 SULFUR HEXAFLUORIDE MICROSPH 60.7-25 MG RECONSTITUTED SUSPENSION: Performed by: NURSE PRACTITIONER

## 2020-11-11 PROCEDURE — 93350 STRESS TTE ONLY: CPT | Performed by: INTERNAL MEDICINE

## 2020-11-11 PROCEDURE — 93017 CV STRESS TEST TRACING ONLY: CPT

## 2020-11-11 PROCEDURE — 93350 STRESS TTE ONLY: CPT

## 2020-11-11 RX ADMIN — SULFUR HEXAFLUORIDE 4 ML: KIT at 09:11

## 2020-11-17 LAB
ASCENDING AORTA: 2.8 CM
BH CV ECHO MEAS - AO MAX PG (FULL): 1.2 MMHG
BH CV ECHO MEAS - AO MAX PG: 7.2 MMHG
BH CV ECHO MEAS - AO MEAN PG (FULL): 0.5 MMHG
BH CV ECHO MEAS - AO MEAN PG: 3.7 MMHG
BH CV ECHO MEAS - AO ROOT AREA (BSA CORRECTED): 1.5
BH CV ECHO MEAS - AO ROOT AREA: 6.8 CM^2
BH CV ECHO MEAS - AO ROOT DIAM: 2.9 CM
BH CV ECHO MEAS - AO V2 MAX: 134.4 CM/SEC
BH CV ECHO MEAS - AO V2 MEAN: 88 CM/SEC
BH CV ECHO MEAS - AO V2 VTI: 28.8 CM
BH CV ECHO MEAS - ASC AORTA: 2.8 CM
BH CV ECHO MEAS - AVA(I,A): 3 CM^2
BH CV ECHO MEAS - AVA(I,D): 3 CM^2
BH CV ECHO MEAS - AVA(V,A): 2.8 CM^2
BH CV ECHO MEAS - AVA(V,D): 2.8 CM^2
BH CV ECHO MEAS - BSA(HAYCOCK): 2 M^2
BH CV ECHO MEAS - BSA: 2 M^2
BH CV ECHO MEAS - BZI_BMI: 32.4 KILOGRAMS/M^2
BH CV ECHO MEAS - BZI_METRIC_HEIGHT: 165.1 CM
BH CV ECHO MEAS - BZI_METRIC_WEIGHT: 88.5 KG
BH CV ECHO MEAS - EDV(CUBED): 61.1 ML
BH CV ECHO MEAS - EDV(MOD-SP2): 79 ML
BH CV ECHO MEAS - EDV(MOD-SP4): 84 ML
BH CV ECHO MEAS - EDV(TEICH): 67.5 ML
BH CV ECHO MEAS - EF(CUBED): 69.2 %
BH CV ECHO MEAS - EF(MOD-BP): 61 %
BH CV ECHO MEAS - EF(MOD-SP2): 60.8 %
BH CV ECHO MEAS - EF(MOD-SP4): 61.9 %
BH CV ECHO MEAS - EF(TEICH): 61.4 %
BH CV ECHO MEAS - ESV(CUBED): 18.8 ML
BH CV ECHO MEAS - ESV(MOD-SP2): 31 ML
BH CV ECHO MEAS - ESV(MOD-SP4): 32 ML
BH CV ECHO MEAS - ESV(TEICH): 26.1 ML
BH CV ECHO MEAS - FS: 32.5 %
BH CV ECHO MEAS - IVS/LVPW: 1.1
BH CV ECHO MEAS - IVSD: 1 CM
BH CV ECHO MEAS - LA DIMENSION: 3.5 CM
BH CV ECHO MEAS - LA/AO: 1.2
BH CV ECHO MEAS - LV DIASTOLIC VOL/BSA (35-75): 42.9 ML/M^2
BH CV ECHO MEAS - LV MASS(C)D: 135.6 GRAMS
BH CV ECHO MEAS - LV MASS(C)DI: 69.3 GRAMS/M^2
BH CV ECHO MEAS - LV MAX PG: 6 MMHG
BH CV ECHO MEAS - LV MEAN PG: 3.2 MMHG
BH CV ECHO MEAS - LV SYSTOLIC VOL/BSA (12-30): 16.4 ML/M^2
BH CV ECHO MEAS - LV V1 MAX: 122.8 CM/SEC
BH CV ECHO MEAS - LV V1 MEAN: 81.8 CM/SEC
BH CV ECHO MEAS - LV V1 VTI: 27.7 CM
BH CV ECHO MEAS - LVIDD: 3.9 CM
BH CV ECHO MEAS - LVIDS: 2.7 CM
BH CV ECHO MEAS - LVLD AP2: 7.9 CM
BH CV ECHO MEAS - LVLD AP4: 7.8 CM
BH CV ECHO MEAS - LVLS AP2: 6.5 CM
BH CV ECHO MEAS - LVLS AP4: 6.6 CM
BH CV ECHO MEAS - LVOT AREA (M): 3.1 CM^2
BH CV ECHO MEAS - LVOT AREA: 3.1 CM^2
BH CV ECHO MEAS - LVOT DIAM: 2 CM
BH CV ECHO MEAS - LVPWD: 1 CM
BH CV ECHO MEAS - MV A MAX VEL: 88.8 CM/SEC
BH CV ECHO MEAS - MV DEC TIME: 0.2 SEC
BH CV ECHO MEAS - MV E MAX VEL: 88.3 CM/SEC
BH CV ECHO MEAS - MV E/A: 0.99
BH CV ECHO MEAS - RAP SYSTOLE: 3 MMHG
BH CV ECHO MEAS - RVSP: 24 MMHG
BH CV ECHO MEAS - SI(AO): 99.8 ML/M^2
BH CV ECHO MEAS - SI(CUBED): 21.6 ML/M^2
BH CV ECHO MEAS - SI(LVOT): 43.4 ML/M^2
BH CV ECHO MEAS - SI(MOD-SP2): 24.5 ML/M^2
BH CV ECHO MEAS - SI(MOD-SP4): 26.6 ML/M^2
BH CV ECHO MEAS - SI(TEICH): 21.2 ML/M^2
BH CV ECHO MEAS - SV(AO): 195.3 ML
BH CV ECHO MEAS - SV(CUBED): 42.3 ML
BH CV ECHO MEAS - SV(LVOT): 85 ML
BH CV ECHO MEAS - SV(MOD-SP2): 48 ML
BH CV ECHO MEAS - SV(MOD-SP4): 52 ML
BH CV ECHO MEAS - SV(TEICH): 41.4 ML
BH CV ECHO MEAS - TR MAX PG: 21 MMHG
BH CV ECHO MEAS - TR MAX VEL: 227 CM/SEC
BH CV STRESS BP STAGE 1: NORMAL
BH CV STRESS BP STAGE 2: NORMAL
BH CV STRESS DURATION MIN STAGE 1: 3
BH CV STRESS DURATION MIN STAGE 2: 3
BH CV STRESS DURATION MIN STAGE 3: 1
BH CV STRESS DURATION SEC STAGE 1: 0
BH CV STRESS DURATION SEC STAGE 2: 0
BH CV STRESS DURATION SEC STAGE 3: 21
BH CV STRESS GRADE STAGE 1: 10
BH CV STRESS GRADE STAGE 2: 12
BH CV STRESS GRADE STAGE 3: 14
BH CV STRESS HR STAGE 1: 110
BH CV STRESS HR STAGE 2: 127
BH CV STRESS HR STAGE 3: 134
BH CV STRESS METS STAGE 1: 5
BH CV STRESS METS STAGE 2: 7.5
BH CV STRESS METS STAGE 3: 10
BH CV STRESS O2 STAGE 1: 89
BH CV STRESS O2 STAGE 2: 87
BH CV STRESS O2 STAGE 3: 85
BH CV STRESS PROTOCOL 1: NORMAL
BH CV STRESS RECOVERY BP: NORMAL MMHG
BH CV STRESS RECOVERY HR: 84 BPM
BH CV STRESS RECOVERY O2: 98 %
BH CV STRESS SPEED STAGE 1: 1.7
BH CV STRESS SPEED STAGE 2: 2.5
BH CV STRESS SPEED STAGE 3: 3.4
BH CV STRESS STAGE 1: 1
BH CV STRESS STAGE 2: 2
BH CV STRESS STAGE 3: 3
BH CV VAS BP LEFT ARM: NORMAL MMHG
BH CV XLRA - RV BASE: 4 CM
BH CV XLRA - RV LENGTH: 6.6 CM
BH CV XLRA - RV MID: 2.2 CM
LV EF 2D ECHO EST: 60 %
MAXIMAL PREDICTED HEART RATE: 152 BPM
PERCENT MAX PREDICTED HR: 88.16 %
STRESS BASELINE BP: NORMAL MMHG
STRESS BASELINE HR: 72 BPM
STRESS O2 SAT REST: 94 %
STRESS PERCENT HR: 104 %
STRESS POST ESTIMATED WORKLOAD: 10.1 METS
STRESS POST EXERCISE DUR MIN: 7 MIN
STRESS POST EXERCISE DUR SEC: 21 SEC
STRESS POST O2 SAT PEAK: 85 %
STRESS POST PEAK BP: NORMAL MMHG
STRESS POST PEAK HR: 134 BPM
STRESS TARGET HR: 129 BPM

## 2020-11-18 DIAGNOSIS — R09.02 OXYGEN DESATURATION: ICD-10-CM

## 2020-11-18 DIAGNOSIS — R06.09 DOE (DYSPNEA ON EXERTION): Primary | ICD-10-CM

## 2020-11-20 ENCOUNTER — LAB (OUTPATIENT)
Dept: PULMONOLOGY | Facility: CLINIC | Age: 70
End: 2020-11-20

## 2020-11-20 DIAGNOSIS — Z01.812 BLOOD TESTS PRIOR TO TREATMENT OR PROCEDURE: Primary | ICD-10-CM

## 2020-11-20 PROCEDURE — U0004 COV-19 TEST NON-CDC HGH THRU: HCPCS | Performed by: INTERNAL MEDICINE

## 2020-11-20 PROCEDURE — 99000 SPECIMEN HANDLING OFFICE-LAB: CPT | Performed by: INTERNAL MEDICINE

## 2020-11-21 LAB — SARS-COV-2 RNA RESP QL NAA+PROBE: NOT DETECTED

## 2020-11-23 ENCOUNTER — OFFICE VISIT (OUTPATIENT)
Dept: PULMONOLOGY | Facility: CLINIC | Age: 70
End: 2020-11-23

## 2020-11-23 VITALS
SYSTOLIC BLOOD PRESSURE: 124 MMHG | HEART RATE: 68 BPM | OXYGEN SATURATION: 97 % | HEIGHT: 65 IN | RESPIRATION RATE: 16 BRPM | DIASTOLIC BLOOD PRESSURE: 74 MMHG | WEIGHT: 193.38 LBS | TEMPERATURE: 96.9 F | BODY MASS INDEX: 32.22 KG/M2

## 2020-11-23 DIAGNOSIS — R06.09 DOE (DYSPNEA ON EXERTION): Primary | ICD-10-CM

## 2020-11-23 DIAGNOSIS — G47.33 OSA (OBSTRUCTIVE SLEEP APNEA): ICD-10-CM

## 2020-11-23 DIAGNOSIS — Z87.891 FORMER SMOKER: ICD-10-CM

## 2020-11-23 DIAGNOSIS — R09.02 HYPOXEMIA: ICD-10-CM

## 2020-11-23 DIAGNOSIS — J44.9 STAGE 2 MODERATE COPD BY GOLD CLASSIFICATION (HCC): ICD-10-CM

## 2020-11-23 PROCEDURE — 94726 PLETHYSMOGRAPHY LUNG VOLUMES: CPT | Performed by: INTERNAL MEDICINE

## 2020-11-23 PROCEDURE — 99205 OFFICE O/P NEW HI 60 MIN: CPT | Performed by: INTERNAL MEDICINE

## 2020-11-23 PROCEDURE — 94729 DIFFUSING CAPACITY: CPT | Performed by: INTERNAL MEDICINE

## 2020-11-23 PROCEDURE — 94060 EVALUATION OF WHEEZING: CPT | Performed by: INTERNAL MEDICINE

## 2020-11-23 RX ORDER — ALBUTEROL SULFATE 90 UG/1
4 AEROSOL, METERED RESPIRATORY (INHALATION) ONCE
Status: COMPLETED | OUTPATIENT
Start: 2020-11-23 | End: 2020-11-23

## 2020-11-23 RX ADMIN — ALBUTEROL SULFATE 4 PUFF: 90 AEROSOL, METERED RESPIRATORY (INHALATION) at 09:59

## 2020-11-23 NOTE — PROGRESS NOTES
Initial Pulmonary Consult Note    Subjective   Reason for consultation/Chief Complaint: Shortness of Breath    Cathi Walker is a 70 y.o. female is being seen for consultation today at the request of Emilie Clarke, *    History of Present Illness  Ms. Walker is a 69yo F with a history of hypertension and LENY who is referred for dyspnea on exertion and hypoxia. She has followed with Dr. Clarke and recently had a stress echo which showed a normal EF and normal RVSP but did show a decrease in oxygenation to 85% at peak stress. This was considered a low-risk study for cardiac disease and she is referred to Pulmonary Clinic for further evaluation.     She has noticed some shortness of breath with exertion for the past couple of years. She notes that her daughter says that she gets breathless with talking on the phone. She sometimes has a dry cough which she attributes to sinus drainage and her Cpap machine. She does not have any wheezing.     She is a former smoker. She quit smoking in 2000. She notes that her father had COPD.     Active Ambulatory Problems     Diagnosis Date Noted   • Progressive angina (CMS/HCC) 09/20/2017   • Essential hypertension 09/20/2017   • Mixed hyperlipidemia 09/20/2017   • Stenosis of right carotid artery 10/02/2019   • CHAMBERS (dyspnea on exertion) 10/20/2020   • Stage 2 moderate COPD by GOLD classification (CMS/HCC) 11/23/2020   • Hypoxemia 11/23/2020   • Former smoker 11/23/2020   • LENY (obstructive sleep apnea) 11/23/2020     Resolved Ambulatory Problems     Diagnosis Date Noted   • No Resolved Ambulatory Problems     Past Medical History:   Diagnosis Date   • Acid reflux    • Anxiety    • Chicken pox    • Hyperlipidemia    • Hypertension    • Measles    • Menopause    • Mitral valve prolapse    • Occasional tremors        Past Surgical History:   Procedure Laterality Date   • CHOLECYSTECTOMY     • COLONOSCOPY     • TONSILLECTOMY AND ADENOIDECTOMY     • URETHRAL SLING          Family History   Problem Relation Age of Onset   • Stroke Mother    • Hypertension Mother    • Hyperlipidemia Mother    • Hyperlipidemia Father    • Hypertension Father    • Heart disease Father    • Heart failure Father    • Hypertension Sister    • Hyperlipidemia Sister    • Hypertension Brother    • Hyperlipidemia Brother        Social History     Socioeconomic History   • Marital status:      Spouse name: Not on file   • Number of children: Not on file   • Years of education: Not on file   • Highest education level: Not on file   Tobacco Use   • Smoking status: Former Smoker     Quit date: 2000     Years since quittin.9   • Smokeless tobacco: Never Used   Substance and Sexual Activity   • Drug use: No   • Sexual activity: Defer       Allergies   Allergen Reactions   • Penicillins Hives       Current Outpatient Medications   Medication Sig Dispense Refill   • amLODIPine (NORVASC) 5 MG tablet Take 1 tablet by mouth Daily. 30 tablet 3   • aspirin 81 MG EC tablet Take 81 mg by mouth Daily.     • atorvastatin (LIPITOR) 80 MG tablet Take 1 tablet by mouth Daily. Need lab work- will mail orders 90 tablet 0   • Calcium Carbonate (CALCIUM 600 PO) Take 600 mg by mouth 2 (Two) Times a Day.     • Cholecalciferol (VITAMIN D3) 2000 units tablet Take 2,000 Units by mouth Daily.     • hydrochlorothiazide (HYDRODIURIL) 25 MG tablet Take 25 mg by mouth Daily.  1   • levothyroxine (SYNTHROID, LEVOTHROID) 125 MCG tablet Take 125 mcg by mouth Daily.  3   • Melatonin 10 MG tablet Take 10 mg by mouth Every Night.     • metoprolol succinate XL (TOPROL-XL) 100 MG 24 hr tablet Take 100 mg by mouth Daily.  1   • Omega-3 Fatty Acids (FISH OIL) 1200 MG capsule capsule Take 1,200 mg by mouth Daily.     • PARoxetine (PAXIL) 40 MG tablet Take 40 mg by mouth Every Morning.  1   • polycarbophil (calcium polycarbophil) 625 MG tablet tablet Take 625 mg by mouth Daily.     • ramipril (ALTACE) 10 MG capsule Take 20 mg by mouth  "Daily.  1   • umeclidinium-vilanterol (ANORO ELLIPTA) 62.5-25 MCG/INH aerosol powder  inhaler Inhale 1 puff Daily. 1 inh once a day 1 each 5     No current facility-administered medications for this visit.        Review of Systems   Constitutional: Negative.    Eyes: Negative.    Respiratory: Positive for shortness of breath.    Cardiovascular: Negative.    Gastrointestinal: Positive for constipation.   Endocrine: Negative.    Genitourinary: Negative.    Musculoskeletal: Positive for arthralgias and back pain.   Skin: Negative.    Allergic/Immunologic: Negative.    Neurological: Positive for tremors and headaches.   Hematological: Negative.    Psychiatric/Behavioral: The patient is nervous/anxious.          Objective   Blood pressure 124/74, pulse 68, temperature 96.9 °F (36.1 °C), resp. rate 16, height 165.1 cm (65\"), weight 87.7 kg (193 lb 6 oz), SpO2 97 %.  Physical Exam  Vitals signs and nursing note reviewed.   Constitutional:       General: She is not in acute distress.     Appearance: She is well-developed.   HENT:      Head: Normocephalic and atraumatic.   Eyes:      General: No scleral icterus.     Conjunctiva/sclera: Conjunctivae normal.      Pupils: Pupils are equal, round, and reactive to light.   Neck:      Musculoskeletal: Normal range of motion and neck supple.      Thyroid: No thyromegaly.      Trachea: No tracheal deviation.   Cardiovascular:      Rate and Rhythm: Normal rate and regular rhythm.      Heart sounds: Normal heart sounds.   Pulmonary:      Effort: Pulmonary effort is normal. No respiratory distress.      Breath sounds: Normal breath sounds.   Abdominal:      General: Bowel sounds are normal.      Palpations: Abdomen is soft.      Tenderness: There is no abdominal tenderness.   Musculoskeletal: Normal range of motion.   Lymphadenopathy:      Cervical: No cervical adenopathy.   Skin:     General: Skin is warm and dry.      Findings: No erythema or rash.   Neurological:      Mental Status: " She is alert and oriented to person, place, and time.      Motor: No abnormal muscle tone.      Coordination: Coordination normal.   Psychiatric:         Speech: Speech normal.         Behavior: Behavior normal.         Judgment: Judgment normal.         PFTs:  Performed in clinic and personally reviewed.   There is moderate airway obstruction. There is no significant response to bronchodilators.   The lung volumes are normal.   The DLCO is normal.     Imaging:  Chest xray performed in clinic and personally reviewed. This is a PA/Lateral film. The cardiac silhouette and mediastinal contours are within normal limits. The lungs are well expanded and grossly clear bilaterally. There is no pneumothorax or pleural effusion.     Impression: No acute cardiopulmonary process.     Assessment/Plan   Diagnoses and all orders for this visit:    1. CHAMBERS (dyspnea on exertion) (Primary)  -     XR Chest PA & Lateral  -     albuterol sulfate HFA (PROVENTIL HFA;VENTOLIN HFA;PROAIR HFA) inhaler 4 puff  -     Pulmonary Function Test    2. Stage 2 moderate COPD by GOLD classification (CMS/[facility])    3. Hypoxemia    4. Former smoker    5. LENY (obstructive sleep apnea)    Other orders  -     umeclidinium-vilanterol (ANORO ELLIPTA) 62.5-25 MCG/INH aerosol powder  inhaler; Inhale 1 puff Daily. 1 inh once a day  Dispense: 1 each; Refill: 5        Discussion:  Ms. Walker is a 71yo F who is referred for shortness of breath.     1. Moderate COPD  - PFTs do have evidence of moderate airway obstruction.   - Start Anoro. Samples given in clinic with instructions for use and Rx sent.   - PRN Albuterol as needed.   - Check an alpha-1 kit.   - No history of recurrent bronchitis.     2. Exercise Induced Hypoxemia  - We will try inhaler therapy first.   - At this time, her room air saturation is normal. There is thus far not much evidence of benefit from oxygen therapy for exertional hypoxemia alone.     3. Former Smoker  - Quit in 2000  - Does not  qualify for lung cancer screening.     4. LENY  - Continue Cpap therapy.     Follow up in 4 months. Instructed to call with any questions or concerns.          I have spent 60 minutes face to face with the patient with greater than 50% of the time spent counseling on review of testing and management of disease.     Liliana V Case, DO  Pulmonary and Critical Care Medicine  Note Electronically Signed

## 2021-01-04 RX ORDER — AMLODIPINE BESYLATE 5 MG/1
TABLET ORAL
Qty: 90 TABLET | Refills: 1 | Status: SHIPPED | OUTPATIENT
Start: 2021-01-04 | End: 2021-07-12

## 2021-04-14 ENCOUNTER — OFFICE VISIT (OUTPATIENT)
Dept: PULMONOLOGY | Facility: CLINIC | Age: 71
End: 2021-04-14

## 2021-04-14 VITALS
BODY MASS INDEX: 29.96 KG/M2 | OXYGEN SATURATION: 97 % | TEMPERATURE: 97.5 F | DIASTOLIC BLOOD PRESSURE: 66 MMHG | HEART RATE: 68 BPM | WEIGHT: 179.8 LBS | SYSTOLIC BLOOD PRESSURE: 124 MMHG | HEIGHT: 65 IN

## 2021-04-14 DIAGNOSIS — G47.33 OSA (OBSTRUCTIVE SLEEP APNEA): ICD-10-CM

## 2021-04-14 DIAGNOSIS — Z87.891 FORMER SMOKER: ICD-10-CM

## 2021-04-14 DIAGNOSIS — J44.9 STAGE 2 MODERATE COPD BY GOLD CLASSIFICATION (HCC): Primary | ICD-10-CM

## 2021-04-14 DIAGNOSIS — R09.02 HYPOXEMIA: ICD-10-CM

## 2021-04-14 PROCEDURE — 99214 OFFICE O/P EST MOD 30 MIN: CPT | Performed by: INTERNAL MEDICINE

## 2021-04-14 PROCEDURE — 36415 COLL VENOUS BLD VENIPUNCTURE: CPT | Performed by: INTERNAL MEDICINE

## 2021-04-14 PROCEDURE — 82103 ALPHA-1-ANTITRYPSIN TOTAL: CPT | Performed by: INTERNAL MEDICINE

## 2021-04-14 NOTE — PROGRESS NOTES
"Pulmonary Office Follow Up      Subjective   Chief Complaint: Shortness of Breath    Cathi Walker is a 70 y.o. female is being seen in follow up for Dyspnea on Exertion    History of Present Illness    Ms. Walker is a 69yo F with a history of hypertension and LENY who was referred for dyspnea on exertion and hypoxia. She has followed with Dr. Clarke and recently had a stress echo which showed a normal EF and normal RVSP but did show a decrease in oxygenation to 85% at peak stress. This was considered a low-risk study for cardiac disease.     She was last seen in clinic on 11/23/20. At that time, PFTs were performed which showed moderate COPD. She was started on Anoro.     She returns to clinic for follow up. Since her last visit, she has been doing well. She is losing weight with weight watchers and walking 2 miles daily. She has some shortness of breath when going uphill. Her insurance did not cover Anoro and she was switched to Stiolto. She thinks her Cpap pressures might need to be adjusted.     The following portions of the patient's history were reviewed and updated as appropriate: allergies, current medications, past family history, past medical history, past social history, past surgical history and problem list.    Review of Systems   Constitutional: Negative.    HENT: Negative.    Eyes: Negative.    Respiratory: Positive for shortness of breath.    Cardiovascular: Negative.    Gastrointestinal: Negative.    Endocrine: Negative.    Genitourinary: Negative.    Musculoskeletal: Negative.    Skin: Negative.    Allergic/Immunologic: Negative.    Neurological: Negative.    Hematological: Negative.    Psychiatric/Behavioral: Negative.          Objective   Blood pressure 124/66, pulse 68, temperature 97.5 °F (36.4 °C), height 165.1 cm (65\"), weight 81.6 kg (179 lb 12.8 oz), SpO2 97 %, not currently breastfeeding.  Physical Exam  Vitals and nursing note reviewed.   Constitutional:       General: She is " not in acute distress.     Appearance: She is well-developed.   HENT:      Head: Normocephalic and atraumatic.   Eyes:      General: No scleral icterus.     Conjunctiva/sclera: Conjunctivae normal.      Pupils: Pupils are equal, round, and reactive to light.   Neck:      Thyroid: No thyromegaly.      Trachea: No tracheal deviation.   Cardiovascular:      Rate and Rhythm: Normal rate and regular rhythm.      Heart sounds: Normal heart sounds.   Pulmonary:      Effort: Pulmonary effort is normal. No respiratory distress.      Breath sounds: Normal breath sounds.   Abdominal:      General: Bowel sounds are normal.      Palpations: Abdomen is soft.      Tenderness: There is no abdominal tenderness.   Musculoskeletal:         General: Normal range of motion.      Cervical back: Normal range of motion and neck supple.   Lymphadenopathy:      Cervical: No cervical adenopathy.   Skin:     General: Skin is warm and dry.      Findings: No erythema or rash.   Neurological:      Mental Status: She is alert and oriented to person, place, and time.      Motor: No abnormal muscle tone.      Coordination: Coordination normal.   Psychiatric:         Speech: Speech normal.         Behavior: Behavior normal.         Judgment: Judgment normal.         PFTs:  No new PFTs.     Imaging:  No new imaging.     Assessment/Plan   Diagnoses and all orders for this visit:    1. Stage 2 moderate COPD by GOLD classification (CMS/HCC) (Primary)  -     Alpha - 1 - Antitrypsin    2. Hypoxemia    3. Former smoker    4. LENY (obstructive sleep apnea)        Discussion:  Ms. Walker is a 69yo F who is followed for COPD.      1. Moderate COPD  - PFTs do have evidence of moderate airway obstruction.   - Continue Stiolto. Sample given in clinic. She has an Rx on file and will call when she needs a new Rx.   - PRN Albuterol as needed.   - Alpha-1-kit was abnormal with a genotype of M/I. We will check a level today. I have given her information on the alpha-1  foundation.   - No history of recurrent bronchitis.      2. Exercise Induced Hypoxemia  - Resolved with weight loss and inhaler therapy.      3. Former Smoker  - Quit in 2000  - Does not qualify for lung cancer screening.      4. LENY  - Continue Cpap therapy.   - She will bring her Cpap card by the clinic tomorrow so that we can try and run a report to see if her pressures need to be adjusted.      Follow up in 6 months.     Liliana DINH Case, DO  Pulmonary and Critical Care Medicine  Note Electronically Signed

## 2021-04-16 LAB — A1AT SERPL-MCNC: 114 MG/DL (ref 101–187)

## 2021-04-19 ENCOUNTER — TELEPHONE (OUTPATIENT)
Dept: PULMONOLOGY | Facility: CLINIC | Age: 71
End: 2021-04-19

## 2021-04-22 ENCOUNTER — OFFICE VISIT (OUTPATIENT)
Dept: CARDIOLOGY | Facility: CLINIC | Age: 71
End: 2021-04-22

## 2021-04-22 VITALS
HEART RATE: 73 BPM | OXYGEN SATURATION: 98 % | BODY MASS INDEX: 29.72 KG/M2 | WEIGHT: 178.4 LBS | HEIGHT: 65 IN | DIASTOLIC BLOOD PRESSURE: 68 MMHG | SYSTOLIC BLOOD PRESSURE: 110 MMHG

## 2021-04-22 DIAGNOSIS — I10 ESSENTIAL HYPERTENSION: Primary | ICD-10-CM

## 2021-04-22 DIAGNOSIS — R06.09 DOE (DYSPNEA ON EXERTION): ICD-10-CM

## 2021-04-22 DIAGNOSIS — E78.2 MIXED HYPERLIPIDEMIA: ICD-10-CM

## 2021-04-22 PROCEDURE — 99214 OFFICE O/P EST MOD 30 MIN: CPT | Performed by: NURSE PRACTITIONER

## 2021-04-22 NOTE — PROGRESS NOTES
[facility] - Office Note  Cathi Walker         [address] [facility] [address]  1950   [phone] (home)        Location:  [facility] office.  Visit Type: Follow Up.    04/22/21     PCP:  Juan Ramon Barroso MD    Identification:  Cathi Walker is a 70 y.o.  female  retired.      Chief Complaint: FU on Chest pain, HTN, HLP    PROBLEM LIST:  1. Chest pain  a. Nuclear Stress Test 2014:  Normal per patient; data insufficient  b.  Stress Echo 10/2/17:  EF 60%, trace MR/ TR.  Good exercise tolerance with borderline hypertensive response @ 200/80.  No ischemia.    c. Stress Echo 11/11/2020:  EF 60%, mild MR, trace TR.  Hypertensive response to exercise with a peak pressure of 202/62 mmHg.  An appropriate augmentation was seen in all myocardial segments with exercise.  No evidence of inducible ischemia by clinical, electrocardiographic or echocardiographic criteria  2.  History of Mitral Valve Prolapse  a.  Echocardiogram 2014: data deficit  b.  Stress Echo 10/2/17:  EF 60%, trace MR/ TR.  Good exercise tolerance with borderline hypertensive response @ 200/80.  No ischemica.  No mention of mitral valve prolapse.   c. Stress Echo 11/11/2020:  Mild MR without prolapse noted.  3.  Essential Hypertension  4.  Hyperlipidemia  5.  Carotid Artery Disease  a. Carotid Duplex 2014, [place]:  1-49% stenosis on right; normal antegrade flow   b. Carotid duplex 10/2020: ABILIO 0-49%, LICA 0-49%, mild bilateral carotid atherosclerosis.   6. Thyroid Disorder  7.   GERD  8.   LENY with CPAP  9. Moderate COPD, follows Dr. Liliana William ([facility])    A.  Chronic dyspnea    B.  PFTs 11/2020 confirm mod COPD.    10.   Surgeries:  a.  Cholecystectomy.         Allergies   Allergen Reactions   • Penicillins Hives         Current Outpatient Medications:   •  amLODIPine (NORVASC) 5 MG tablet, TAKE 1 TABLET BY MOUTH EVERY DAY, Disp: 90 tablet, Rfl: 1  •  aspirin 81 MG EC tablet, Take 81 mg by mouth Daily.,  Disp: , Rfl:   •  atorvastatin (LIPITOR) 80 MG tablet, Take 1 tablet by mouth Daily. Need lab work- will mail orders, Disp: 90 tablet, Rfl: 0  •  Calcium Carbonate (CALCIUM 600 PO), Take 600 mg by mouth 2 (Two) Times a Day., Disp: , Rfl:   •  Cholecalciferol (VITAMIN D3) 2000 units tablet, Take 2,000 Units by mouth Daily., Disp: , Rfl:   •  hydrochlorothiazide (HYDRODIURIL) 25 MG tablet, Take 25 mg by mouth Daily., Disp: , Rfl: 1  •  levothyroxine (SYNTHROID, LEVOTHROID) 125 MCG tablet, Take 112 mcg by mouth Daily., Disp: , Rfl: 3  •  Melatonin 10 MG tablet, Take 10 mg by mouth Every Night. prn, Disp: , Rfl:   •  metoprolol succinate XL (TOPROL-XL) 100 MG 24 hr tablet, Take 100 mg by mouth Daily., Disp: , Rfl: 1  •  Omega-3 Fatty Acids (FISH OIL) 1200 MG capsule capsule, Take 1,200 mg by mouth Daily., Disp: , Rfl:   •  PARoxetine (PAXIL) 40 MG tablet, Take 40 mg by mouth Every Morning., Disp: , Rfl: 1  •  polycarbophil (calcium polycarbophil) 625 MG tablet tablet, Take 625 mg by mouth Daily., Disp: , Rfl:   •  ramipril (ALTACE) 10 MG capsule, Take 20 mg by mouth Daily., Disp: , Rfl: 1  •  tiotropium bromide-olodaterol (STIOLTO RESPIMAT) 2.5-2.5 MCG/ACT aerosol solution inhaler, Inhale 2 puffs Daily., Disp: 1 inhaler, Rfl: 5    HPI  Cathi Walker is here today for a routine 6 month follow up on HTN, HLP, chest pain, carotid disease, h/o MVP.  Since her last visit with us, she was set up for a Stress Echo.  Results show normal LV function, mild MR without evidence of ischemia based on images or EKG tracings.  She has also seen Pulmonology a few times since her last visit with us.  She continues to use CPAP.  PFTs done back in November show moderate COPD and she is using Stiolto for this with noted relief.    From a cardiac standpoint, she is doing well.  Her exertional dyspnea has improved. She has no complaints of chest pain or angina.  She denies pedal edema, denies orthopnea or PND.  She is tolerating  "medications and asking if she can get an order for a lipid panel.  She has lost a moderate amount of weight doing Weight Watchers and walking routinely now that she is breathing better.              The following portions of the patient's history were reviewed in the chart and updated as appropriate: allergies, current medications, past family history, past medical history, past social history, past surgical history and problem list.    Review of Systems   Constitutional: Negative for malaise/fatigue.   Cardiovascular: Positive for dyspnea on exertion. Negative for chest pain, leg swelling and palpitations.   Respiratory: Negative for cough, shortness of breath and wheezing.    Hematologic/Lymphatic: Negative for bleeding problem.   Musculoskeletal: Negative for muscle weakness and myalgias.   All other systems reviewed and are negative.            height is 165.1 cm (65\") and weight is 80.9 kg (178 lb 6.4 oz). Her blood pressure is 110/68 and her pulse is 73. Her oxygen saturation is 98%.   Vitals reviewed.   Constitutional:       Appearance: Normal appearance. Well-developed.   Pulmonary:      Effort: Pulmonary effort is normal.      Breath sounds: Normal breath sounds. No wheezing. No rhonchi. No rales.   Cardiovascular:      Normal rate. Regular rhythm.   Pulses:     Intact distal pulses.   Abdominal:      General: Bowel sounds are normal.      Palpations: Abdomen is soft.   Neurological:      Mental Status: Alert.           Procedures     Assessment/ Plan   Diagnoses and all orders for this visit:    1. Essential hypertension (Primary):  Very well controlled.  Continue current medical regimen.  Continue aerobic activity.  Get routine blood work. RTC 1 year or sooner PRN.    2. Mixed hyperlipidemia:  Continue statin, appropriate diet.  Patient participates in Weight Watchers and walks for routine exercise.  Continue current therapies.  She was given order for lipid panel and CMP to be done fasting.  Will call " her with results.    3. CHAMBERS (dyspnea on exertion):  Improving with current therapy Rx by Pulmonologist for moderate COPD.  Continue to follow with them and continue treatment.  She is going today to get CPAP refitted.      Advance Care Planning   ACP discussion was held with the patient during this visit. Patient has an advance directive (not in EMR), copy requested.      Maki William PA-C functioning independently.  4/22/2021 10:33 EDT

## 2021-07-12 RX ORDER — AMLODIPINE BESYLATE 5 MG/1
TABLET ORAL
Qty: 90 TABLET | Refills: 3 | Status: SHIPPED | OUTPATIENT
Start: 2021-07-12

## 2021-10-27 RX ORDER — TIOTROPIUM BROMIDE AND OLODATEROL 3.124; 2.736 UG/1; UG/1
SPRAY, METERED RESPIRATORY (INHALATION)
Qty: 2 EACH | Refills: 1 | Status: SHIPPED | OUTPATIENT
Start: 2021-10-27 | End: 2022-01-24

## 2021-10-30 NOTE — PROGRESS NOTES
Cathi Walker  1950  69 y.o.  [phone]      Date: 10/02/2019    PCP: Juan Ramon Barroso MD    Chief Complaint   Patient presents with   • Progressive angina (CMS/HCC)       Problem List:  1. Chest pain  a. Nuclear Stress Test 2014:  Normal per patient; data insufficient  b.  Stress Echo 10/2/17:  EF 60%, trace MR/ TR.  Good exercise tolerance with borderline hypertensive response @ 200/80.  No ischemica.    2.  Mitral Valve Prolapse  a.  Echocardiogram 2014: data deficit  3.   Hypertension  4.  Hyperlipidemia  5.   Carotid Disease  a.   Carotid Duplex 2014, [place]:  1-49% stenosis on right; normal antegrade flow   6. Thyroid Disorder  7.   GERD  8.   LENY with CPAP  9.   Surgeries:  a.  Cholecystectomy.      Allergies   Allergen Reactions   • Penicillins Hives       Current Medications:      Current Outpatient Medications:   •  aspirin 81 MG EC tablet, Take 81 mg by mouth Daily., Disp: , Rfl:   •  atorvastatin (LIPITOR) 80 MG tablet, TAKE 1 TABLET BY MOUTH EVERY DAY, Disp: 90 tablet, Rfl: 2  •  Calcium Carbonate (CALCIUM 600 PO), Take 600 mg by mouth 2 (Two) Times a Day., Disp: , Rfl:   •  Cholecalciferol (VITAMIN D3) 2000 units tablet, Take 2,000 Units by mouth Daily., Disp: , Rfl:   •  hydrochlorothiazide (HYDRODIURIL) 25 MG tablet, Take 25 mg by mouth Daily., Disp: , Rfl: 1  •  levothyroxine (SYNTHROID, LEVOTHROID) 112 MCG tablet, Take 112 mcg by mouth Daily., Disp: , Rfl: 3  •  metoprolol succinate XL (TOPROL-XL) 100 MG 24 hr tablet, Take 100 mg by mouth Daily., Disp: , Rfl: 1  •  Omega-3 Fatty Acids (FISH OIL) 1200 MG capsule capsule, Take 1,200 mg by mouth Daily., Disp: , Rfl:   •  PARoxetine (PAXIL) 20 MG tablet, Take 20 mg by mouth As Needed., Disp: , Rfl: 1  •  ramipril (ALTACE) 10 MG capsule, Take 20 mg by mouth Daily., Disp: , Rfl: 1    HPI    Cathi Walker is a 69 y.o. female who presents today for an annual follow up of hypertension, hyperlipidemia, and carotid stenosis.. Since  "last visit, Pt had lost 20 pounds as a result of exercising and watching her diet however due to anxiety and family health issues she has not been able to exercise recently and has gained her weight back.. She otherwise is feeling well from a cardiac standpoint. She admits she has been living a sedentary lifestyle recently. Patient denies chest pain, palpitations, edema, dizziness, and syncope.     The following portions of the patient's history were reviewed and updated as appropriate: allergies, current medications and problem list.    Pertinent positives as listed in the HPI.  All other systems reviewed are negative.    Vitals:    10/02/19 1537   BP: 124/64   BP Location: Right arm   Patient Position: Sitting   Pulse: 70   Weight: 93.9 kg (207 lb)   Height: 161.3 cm (63.5\")       Physical Exam:    General: Alert and oriented.  Neck: Jugular venous pressure is within normal limits. Carotids have normal upstrokes without bruits.   Cardiovascular: Heart has a nondisplaced focal PMI. Regular rate and rhythm without murmur, gallop or rub.  Lungs: Clear without rales or wheezes. Equal expansion is noted.   Extremities: Show no edema.  Skin: Warm and dry.  Neurologic: Nonfocal.    Diagnostic Data:     Last lipid panel 10/24/18      HDL 57  LDL 59        Procedures    Assessment:      ICD-10-CM ICD-9-CM   1. Essential hypertension I10 401.9   2. Mixed hyperlipidemia E78.2 272.2   3. Stenosis of right carotid artery I65.21 433.10     Lab results found above were reviewed with the patient.    Plan:    1. Schedule carotid duplex before next visit to reassess carotid stenosis  2. Increase exercise   3. Conintue metoprolol, ramipril and HCT for hypertension  4. Continue atorvastatin 80 mg for hyperlipidemia   5. Lipid panel with Dr. Barroso in the near future.  6. continue aspirin for carotid stenosis  7. Continue all other current medications.  8. F/up in 12 months or sooner if needed.        Scribed for Emilie BRADSHAW " MD Vince by Tamar Godinez. 10/2/2019  12:40 PM    I Emilie Clarke MD personally performed the services described in this documentation as scribed by the above individual in my presence, and it is both accurate and complete.    Emilie Clarke MD, FACC           HOME

## 2022-01-24 RX ORDER — TIOTROPIUM BROMIDE AND OLODATEROL 3.124; 2.736 UG/1; UG/1
SPRAY, METERED RESPIRATORY (INHALATION)
Qty: 1 EACH | Refills: 3 | Status: SHIPPED | OUTPATIENT
Start: 2022-01-24 | End: 2023-01-05 | Stop reason: SDUPTHER

## 2022-05-03 NOTE — PROGRESS NOTES
[facility] Cardiology    Patient ID: Cathi Walker is a 71 y.o. female.  : 1950   Contact: [phone]    Encounter date: 2022    PCP: Juan Ramon Barroso MD      Chief complaint:   Chief Complaint   Patient presents with   • Shortness of Breath   • Hypertension   • Hyperlipidemia       Problem List:  1. Chest pain  a. Nuclear Stress Test :  Normal per patient; data insufficient  b.  Stress Echo 10/2/17:  EF 60%, trace MR/ TR.  Good exercise tolerance with borderline hypertensive response @ 200/80.  No ischemia.    c. Stress Echo 2020:  EF 60%, mild MR, trace TR.  Hypertensive response to exercise with a peak pressure of 202/62 mmHg.  An appropriate augmentation was seen in all myocardial segments with exercise.  No evidence of inducible ischemia by clinical, electrocardiographic or echocardiographic criteria  2.  History of Mitral Valve Prolapse  a.  Echocardiogram : data deficit  b.  Stress Echo 10/2/17:  EF 60%, trace MR/ TR.  Good exercise tolerance with borderline hypertensive response @ 200/80.  No ischemica.  No mention of mitral valve prolapse.   c. Stress Echo 2020:  Mild MR without prolapse noted.  3.  Essential Hypertension  4.  Hyperlipidemia  5.  Carotid Artery Disease  a. Carotid Duplex , Rayna:  1-49% stenosis on right; normal antegrade flow   b. Carotid duplex 10/2020: ABILIO 0-49%, LICA 0-49%, mild bilateral carotid atherosclerosis.   6. Thyroid Disorder  7.   GERD  8.   LENY with CPAP  9. Moderate COPD, follows Dr. Liliana William ([facility])                   A.  Chronic dyspnea                   B.  PFTs 2020 confirm mod COPD.    10.   Surgeries:  a.  Cholecystectomy.    Allergies   Allergen Reactions   • Penicillins Hives       Current Medications:    Current Outpatient Medications:   •  amLODIPine (NORVASC) 5 MG tablet, TAKE 1 TABLET BY MOUTH EVERY DAY, Disp: 90 tablet, Rfl: 3  •  aspirin 81 MG EC tablet, Take 81 mg by mouth  Daily., Disp: , Rfl:   •  atorvastatin (LIPITOR) 80 MG tablet, Take 1 tablet by mouth Daily. Need lab work- will mail orders, Disp: 90 tablet, Rfl: 0  •  Calcium Carb-Cholecalciferol 600-400 MG-UNIT tablet, Calcium 600 + D(3) 600 mg-10 mcg (400 unit) tablet, Disp: , Rfl:   •  Cholecalciferol (VITAMIN D3) 2000 units tablet, Take 2,000 Units by mouth Daily., Disp: , Rfl:   •  docusate sodium (COLACE) 100 MG capsule, , Disp: , Rfl:   •  hydrochlorothiazide (HYDRODIURIL) 25 MG tablet, Take 25 mg by mouth Daily., Disp: , Rfl: 1  •  levothyroxine (SYNTHROID, LEVOTHROID) 112 MCG tablet, Take 112 mcg by mouth Every Morning Before Breakfast., Disp: , Rfl:   •  metoprolol succinate XL (TOPROL-XL) 100 MG 24 hr tablet, Take 100 mg by mouth Daily., Disp: , Rfl: 1  •  multivitamins-minerals (PRESERVISION AREDS 2) capsule capsule, , Disp: , Rfl:   •  Omega-3 Fatty Acids (FISH OIL) 1200 MG capsule capsule, Take 1,200 mg by mouth Daily., Disp: , Rfl:   •  ramipril (ALTACE) 10 MG capsule, Take 20 mg by mouth Daily., Disp: , Rfl: 1  •  Stiolto Respimat 2.5-2.5 MCG/ACT aerosol solution inhaler, INHALE 2 PUFFS BY MOUTH EVERY DAY, Disp: 1 each, Rfl: 3    HPI    Cathi Walker is a 71 y.o. female who presents today for a one year follow up of dyspnea on exertion and cardiac risk factors. Since last visit, the patient has been doing well overall from a cardiovascular standpoint. She asked if she should follow up about her TSH levels. She stays active and busy. She [place] She has lost about 10 pounds and plans to start weight watchers. Patient denies chest pain, shortness of breath, orthopnea, palpitations, edema, dizziness, and syncope.     The following portions of the patient's history were reviewed and updated as appropriate: allergies, current medications and problem list.    Pertinent positives as listed in the HPI.  All other systems reviewed are  "negative.         Vitals:    05/04/22 1127   BP: 124/68   BP Location: Left arm   Patient Position: Sitting   Pulse: 72   SpO2: 98%   Weight: 82.1 kg (181 lb)   Height: 165.1 cm (65\")       Physical Exam:  General: Alert and oriented.  Neck: Jugular venous pressure is within normal limits. Carotids have normal upstrokes without bruits.   Cardiovascular: Heart has a nondisplaced focal PMI. Regular rate and rhythm. No murmur, gallop or rub.  Lungs: Clear, no rales or wheezes. Equal expansion is noted.   Extremities: Show no edema.  Skin: Warm and dry.  Neurologic: Nonfocal.     Diagnostic Data (reviewed with patient):    Lab date: 04/28/2022  • FLP: , TG 87, HDL 64, LDL 56  • CMP: Glu 107, BUN 21, Creat 0.81, eGFR 74, Na 142, K 5.0, Cl 103, CO2 33, Ca 9.7, Alk Phos 93, AST 30, ALT 34  • CBC: WBC 8.0, RBC 4.77, HGB 14.4, HCT 42.4, MCV 88.9, MCH 30.2,           ECG 12 Lead    Date/Time: 5/4/2022 11:45 AM  Performed by: Emilie Clarke MD  Authorized by: Emilie Clarke MD   Comparison: compared with previous ECG from 9/20/2017  Similar to previous ECG  Rhythm: sinus rhythm  BPM: 68    Clinical impression: normal ECG              Assessment:    ICD-10-CM ICD-9-CM   1. CHAMBERS (dyspnea on exertion)  R06.00 786.09   2. Bilateral carotid artery stenosis  I65.23 433.10     433.30   3. Essential hypertension  I10 401.9   4. Mixed hyperlipidemia  E78.2 272.2         Plan:  1. Stable cardiac status.   2. Continue on amlodipine 5 mg daily for hypertension.   3. Continue on aspirin 81 mg for antiplatelet therapy.   4. Continue on atorvastatin 80 mg daily for hyperlipidemia.   5. Continue on hydrochlorothiazide 25 mg daily for hypertension.  6. Continue on metoprolol 100 mg daily for rate control and hypertension.   7. Continue on Omega-3 fatty acids for hyperlipidemia.  8. Continue on ramipril 10 mg daily for hypertension.   9. Continue all other current medications.  10. F/up in 12 months, sooner if " needed.    Scribed for Emilie Clarke MD by Meredith Westfall. 5/4/2022 11:40 EDT      I Emilie Clarke MD personally performed the services described in this documentation as scribed by the above individual in my presence, and it is both accurate and complete.    Emilie Clarke MD, FACC

## 2022-05-04 ENCOUNTER — OFFICE VISIT (OUTPATIENT)
Dept: CARDIOLOGY | Facility: CLINIC | Age: 72
End: 2022-05-04

## 2022-05-04 VITALS
BODY MASS INDEX: 30.16 KG/M2 | HEART RATE: 72 BPM | OXYGEN SATURATION: 98 % | WEIGHT: 181 LBS | HEIGHT: 65 IN | DIASTOLIC BLOOD PRESSURE: 68 MMHG | SYSTOLIC BLOOD PRESSURE: 124 MMHG

## 2022-05-04 DIAGNOSIS — I10 ESSENTIAL HYPERTENSION: ICD-10-CM

## 2022-05-04 DIAGNOSIS — I65.23 BILATERAL CAROTID ARTERY STENOSIS: ICD-10-CM

## 2022-05-04 DIAGNOSIS — R06.09 DOE (DYSPNEA ON EXERTION): Primary | ICD-10-CM

## 2022-05-04 DIAGNOSIS — E78.2 MIXED HYPERLIPIDEMIA: ICD-10-CM

## 2022-05-04 PROCEDURE — 99214 OFFICE O/P EST MOD 30 MIN: CPT | Performed by: INTERNAL MEDICINE

## 2022-05-04 PROCEDURE — 93000 ELECTROCARDIOGRAM COMPLETE: CPT | Performed by: INTERNAL MEDICINE

## 2022-05-04 RX ORDER — DOCUSATE SODIUM 100 MG/1
CAPSULE, LIQUID FILLED ORAL
COMMUNITY

## 2022-05-04 RX ORDER — ANTIOX #8/OM3/DHA/EPA/LUT/ZEAX 250-2.5 MG
CAPSULE ORAL
COMMUNITY
Start: 2021-12-03

## 2022-05-04 RX ORDER — LEVOTHYROXINE SODIUM 112 UG/1
112 TABLET ORAL
COMMUNITY
Start: 2022-04-24

## 2022-12-05 ENCOUNTER — TRANSCRIBE ORDERS (OUTPATIENT)
Dept: ADMINISTRATIVE | Facility: HOSPITAL | Age: 72
End: 2022-12-05

## 2022-12-05 DIAGNOSIS — Z12.31 ENCOUNTER FOR SCREENING MAMMOGRAM FOR BREAST CANCER: Primary | ICD-10-CM

## 2023-01-05 ENCOUNTER — OFFICE VISIT (OUTPATIENT)
Dept: PULMONOLOGY | Facility: CLINIC | Age: 73
End: 2023-01-05
Payer: MEDICARE

## 2023-01-05 VITALS
BODY MASS INDEX: 30.92 KG/M2 | WEIGHT: 185.6 LBS | HEIGHT: 65 IN | HEART RATE: 72 BPM | DIASTOLIC BLOOD PRESSURE: 90 MMHG | OXYGEN SATURATION: 98 % | SYSTOLIC BLOOD PRESSURE: 162 MMHG | TEMPERATURE: 98.4 F

## 2023-01-05 DIAGNOSIS — R09.02 HYPOXEMIA: ICD-10-CM

## 2023-01-05 DIAGNOSIS — Z01.818 PREOPERATIVE CLEARANCE: Primary | ICD-10-CM

## 2023-01-05 DIAGNOSIS — J44.9 STAGE 2 MODERATE COPD BY GOLD CLASSIFICATION: ICD-10-CM

## 2023-01-05 DIAGNOSIS — Z87.891 FORMER SMOKER: ICD-10-CM

## 2023-01-05 DIAGNOSIS — G47.33 OSA (OBSTRUCTIVE SLEEP APNEA): ICD-10-CM

## 2023-01-05 PROCEDURE — 94726 PLETHYSMOGRAPHY LUNG VOLUMES: CPT | Performed by: INTERNAL MEDICINE

## 2023-01-05 PROCEDURE — 94729 DIFFUSING CAPACITY: CPT | Performed by: INTERNAL MEDICINE

## 2023-01-05 PROCEDURE — 99214 OFFICE O/P EST MOD 30 MIN: CPT | Performed by: INTERNAL MEDICINE

## 2023-01-05 PROCEDURE — 94375 RESPIRATORY FLOW VOLUME LOOP: CPT | Performed by: INTERNAL MEDICINE

## 2023-01-05 RX ORDER — TIOTROPIUM BROMIDE AND OLODATEROL 3.124; 2.736 UG/1; UG/1
2 SPRAY, METERED RESPIRATORY (INHALATION) DAILY
Qty: 1 EACH | Refills: 11 | Status: SHIPPED | OUTPATIENT
Start: 2023-01-05

## 2023-01-05 NOTE — PROGRESS NOTES
Pulmonary Office Follow Up      Subjective   Chief Complaint: Shortness of Breath    Cathi Walker is a 72 y.o. female is being seen in follow up for Dyspnea on Exertion    History of Present Illness    Ms. Walker is a 71yo F with a history of hypertension and LENY who was referred for dyspnea on exertion and hypoxia. She has followed with Dr. Clarke and recently had a stress echo which showed a normal EF and normal RVSP but did show a decrease in oxygenation to 85% at peak stress. This was considered a low-risk study for cardiac disease.     She was last seen in clinic on 4/14/21.    She returns to clinic for follow up. Since her last visit, she has not had any exacerbations. She has not really been using her Stiolto inhaler. She has not received a new Cpap as part of the Vazquez recall. She is scheduled to have a left knee replacement on 1/23 and needs preoperative clearance.     The following portions of the patient's history were reviewed and updated as appropriate: allergies, current medications, past family history, past medical history, past social history, past surgical history and problem list.    Review of Systems   Constitutional: Negative.    HENT: Negative.    Eyes: Negative.    Respiratory: Negative.    Cardiovascular: Negative.    Gastrointestinal: Negative.    Endocrine: Negative.    Genitourinary: Negative.    Musculoskeletal: Positive for arthralgias.   Skin: Negative.    Allergic/Immunologic: Negative.    Neurological: Negative.    Hematological: Negative.    Psychiatric/Behavioral: Negative.          Objective   Blood pressure 162/90, pulse 72, temperature 98.4 °F (36.9 °C), temperature source Infrared, height 165.1 cm (65\"), weight 84.2 kg (185 lb 9.6 oz), SpO2 98 %, not currently breastfeeding.  Physical Exam  Vitals and nursing note reviewed.   Constitutional:       General: She is not in acute distress.     Appearance: She is well-developed.   HENT:      Head: Normocephalic and  atraumatic.   Eyes:      General: No scleral icterus.     Conjunctiva/sclera: Conjunctivae normal.      Pupils: Pupils are equal, round, and reactive to light.   Neck:      Thyroid: No thyromegaly.      Trachea: No tracheal deviation.   Cardiovascular:      Rate and Rhythm: Normal rate and regular rhythm.      Heart sounds: Normal heart sounds.   Pulmonary:      Effort: Pulmonary effort is normal. No respiratory distress.      Breath sounds: Normal breath sounds.   Abdominal:      General: Bowel sounds are normal.      Palpations: Abdomen is soft.      Tenderness: There is no abdominal tenderness.   Musculoskeletal:         General: Normal range of motion.      Cervical back: Normal range of motion and neck supple.   Lymphadenopathy:      Cervical: No cervical adenopathy.   Skin:     General: Skin is warm and dry.      Findings: No erythema or rash.   Neurological:      Mental Status: She is alert and oriented to person, place, and time.      Motor: No abnormal muscle tone.      Coordination: Coordination normal.   Psychiatric:         Speech: Speech normal.         Behavior: Behavior normal.         Judgment: Judgment normal.         PFTs:  PFTs performed in clinic and personally reviewed.  There is no airway obstruction.  The lung volumes are normal.  The DLCO is normal.     Imaging:  No new imaging.     ARISCAT Preoperative Pulmonary Risk Index.    Age []   <= 50 years old (0 points)    [x]   51-80 years old (3 points)    []   >80 years old (16 points)       Preoperative Oxygen Saturation [x]   >= 96% (0 points)    []   91-95% (8 points)    []   <= 90% (24 points)       Other Clinical Risk Factors []   Respiratory Infection in the last month (17 points)    []   Pre-operative anemia: Hb < 10 g/dL (11 points)    []   Emergency Surgery (8 points)       Surgical Incision []   Upper abdominal (15 points)    []   Intrathoracic (24 points)       Duration of Surgery [x]   < 2 hours (0 points)    []   2-3 hours (16  points)    []   >3 hours (23 points)       Total Criteria Point Count: 3     ARISCAT risk index interpretation:      0-25 points: Low risk  1.6 % pulmonary complication rate.   26-44 points: Intermediate risk 13.3% pulmonary complication rate.    points: High risk 42.1 % pulmonary complication rate.     Postoperative Pulmonary Complications include but are not limited to: Respiratory Failure, Pulmonary Infection, Pleural Effusion, Atelectasis, Pneumothorax Bronchospasm, Aspiration Pneumonia.    Assessment & Plan   Diagnoses and all orders for this visit:    1. Preoperative clearance (Primary)  -     XR Chest PA & Lateral  -     Pulmonary Function Test    2. Stage 2 moderate COPD by GOLD classification ([facility])    3. Hypoxemia    4. Former smoker    5. LEYN (obstructive sleep apnea)    Other orders  -     tiotropium bromide-olodaterol (Stiolto Respimat) 2.5-2.5 MCG/ACT aerosol solution inhaler; Inhale 2 puffs Daily.  Dispense: 1 each; Refill: 11        Discussion:  Ms. Walker is a 73yo F who is followed for COPD.      1. Moderate COPD  - PFTs with evidence of moderate airway obstruction previously. No airway obstruction present today.   - Resume Stiolto. I have given her samples in clinic and sent a new prescription.    - PRN Albuterol as needed.   - Alpha-1-kit was abnormal with a genotype of M/I. Alpha-1 level was normal.  - No history of recurrent bronchitis.      2. Exercise Induced Hypoxemia  - Resolved with weight loss and inhaler therapy.      3. Former Smoker  - Quit in 2000  - Does not qualify for lung cancer screening.      4. LENY  - Resume Cpap therapy when she can get a new machine as part of the Vazquez recall.     5. Preoperative Clearance  - She is low risk for pulmonary complications based on ARISCAT score.     Follow up in 1 year.     Liliana William, DO  Pulmonary and Critical Care Medicine  Note Electronically Signed

## 2023-01-12 ENCOUNTER — APPOINTMENT (OUTPATIENT)
Dept: OTHER | Facility: HOSPITAL | Age: 73
End: 2023-01-12
Payer: MEDICARE

## 2023-01-12 ENCOUNTER — HOSPITAL ENCOUNTER (OUTPATIENT)
Dept: MAMMOGRAPHY | Facility: HOSPITAL | Age: 73
Discharge: HOME | End: 2023-01-12
Admitting: FAMILY MEDICINE
Payer: MEDICARE

## 2023-01-12 DIAGNOSIS — Z12.31 ENCOUNTER FOR SCREENING MAMMOGRAM FOR BREAST CANCER: ICD-10-CM

## 2023-01-12 DIAGNOSIS — Z92.89 HISTORY OF MAMMOGRAM: ICD-10-CM

## 2023-01-12 PROCEDURE — 77067 SCR MAMMO BI INCL CAD: CPT | Performed by: RADIOLOGY

## 2023-01-12 PROCEDURE — 77063 BREAST TOMOSYNTHESIS BI: CPT

## 2023-01-12 PROCEDURE — 77067 SCR MAMMO BI INCL CAD: CPT

## 2023-01-12 PROCEDURE — 77063 BREAST TOMOSYNTHESIS BI: CPT | Performed by: RADIOLOGY

## 2023-05-25 ENCOUNTER — HOSPITAL ENCOUNTER (EMERGENCY)
Facility: HOSPITAL | Age: 73
Discharge: HOME | End: 2023-05-25
Attending: EMERGENCY MEDICINE
Payer: MEDICARE

## 2023-05-25 ENCOUNTER — APPOINTMENT (OUTPATIENT)
Dept: CARDIOLOGY | Facility: HOSPITAL | Age: 73
End: 2023-05-25
Payer: MEDICARE

## 2023-05-25 ENCOUNTER — TRANSCRIBE ORDERS (OUTPATIENT)
Dept: ADMINISTRATIVE | Facility: HOSPITAL | Age: 73
End: 2023-05-25

## 2023-05-25 VITALS
SYSTOLIC BLOOD PRESSURE: 158 MMHG | WEIGHT: 195 LBS | BODY MASS INDEX: 34.55 KG/M2 | TEMPERATURE: 97.8 F | HEART RATE: 78 BPM | HEIGHT: 63 IN | RESPIRATION RATE: 18 BRPM | DIASTOLIC BLOOD PRESSURE: 96 MMHG | OXYGEN SATURATION: 96 %

## 2023-05-25 DIAGNOSIS — M79.89 LEFT LEG SWELLING: Primary | ICD-10-CM

## 2023-05-25 DIAGNOSIS — R79.89 POSITIVE D DIMER: ICD-10-CM

## 2023-05-25 DIAGNOSIS — R60.0 EDEMA OF BOTH LOWER EXTREMITIES: Primary | ICD-10-CM

## 2023-05-25 PROCEDURE — 99282 EMERGENCY DEPT VISIT SF MDM: CPT

## 2023-05-25 PROCEDURE — 93971 EXTREMITY STUDY: CPT

## 2023-05-25 NOTE — ED PROVIDER NOTES
Subjective   History of Present Illness  Ms. Walker is a pleasant 72-year-old female who presents to the emergency department with complaints of left leg swelling.  She notes that she went on a trip to [place] recently and that she had increased swelling in both legs but mostly on the left leg.  She saw her PCP today and was noted to have an elevated D-dimer (exact value unknown) and was sent to our emergency department for further evaluation to rule out DVT.  Patient denies any chest pain or shortness of breath.  She notes that she had left knee surgery in January of this year.        Review of Systems   Constitutional: Negative for chills and fever.   HENT: Negative for sore throat.    Respiratory: Negative for cough and shortness of breath.    Cardiovascular: Positive for leg swelling (Bilateral lower extremity swelling, left greater than right.). Negative for chest pain.   Gastrointestinal: Negative for abdominal pain, diarrhea, nausea and vomiting.   Genitourinary: Negative for decreased urine volume and dysuria.   Musculoskeletal:        Left lower leg swelling   Skin: Negative for rash.   Allergic/Immunologic: Negative for immunocompromised state.   Neurological: Negative for numbness and headaches.   Hematological: Negative.    Psychiatric/Behavioral: Negative.        Past Medical History:   Diagnosis Date   • Acid reflux    • Anxiety    • Chicken pox    • Hyperlipidemia    • Hypertension    • Measles    • Menopause    • Mitral valve prolapse    • Occasional tremors        Allergies   Allergen Reactions   • Penicillins Hives   • Levofloxacin Other (See Comments)     Joint aches       Past Surgical History:   Procedure Laterality Date   • CHOLECYSTECTOMY     • COLONOSCOPY     • TONSILLECTOMY AND ADENOIDECTOMY     • URETHRAL SLING         Family History   Problem Relation Age of Onset   • Stroke Mother    • Hypertension Mother    • Hyperlipidemia Mother    • Hyperlipidemia Father    • Hypertension Father    •  Heart disease Father    • Heart failure Father    • Breast cancer Sister 37   • Hypertension Sister    • Hyperlipidemia Sister    • Arthritis Sister    • Arthritis Brother    • Hypertension Brother    • Hyperlipidemia Brother    • Ovarian cancer Neg Hx        Social History     Socioeconomic History   • Marital status:    Tobacco Use   • Smoking status: Former     Packs/day: 1.00     Years: 30.00     Pack years: 30.00     Types: Cigarettes     Quit date:      Years since quittin.4   • Smokeless tobacco: Never   Vaping Use   • Vaping Use: Never used   Substance and Sexual Activity   • Alcohol use: Yes     Comment: occas   • Drug use: No   • Sexual activity: Defer           Objective   Physical Exam  Constitutional:       Appearance: Normal appearance.   HENT:      Head: Normocephalic.      Nose: Nose normal.      Mouth/Throat:      Mouth: Mucous membranes are moist.   Eyes:      General: No scleral icterus.     Conjunctiva/sclera: Conjunctivae normal.      Pupils: Pupils are equal, round, and reactive to light.   Cardiovascular:      Rate and Rhythm: Normal rate and regular rhythm.      Pulses: Normal pulses.      Heart sounds: Normal heart sounds.   Pulmonary:      Effort: Pulmonary effort is normal.      Breath sounds: Normal breath sounds.   Musculoskeletal:         General: Normal range of motion.      Cervical back: Normal range of motion and neck supple.      Left lower leg: Edema (1+) present.   Skin:     General: Skin is warm and dry.   Neurological:      General: No focal deficit present.      Mental Status: She is alert and oriented to person, place, and time.      Sensory: Sensory deficit:      Psychiatric:         Mood and Affect: Mood normal.         Procedures           ED Course      In summary, 72-year-old female presents with left lower leg swelling.  She had a recent trip to [place] and saw her PCP today and had an elevated D-dimer.  She was sent to the emergency department to rule  out DVT.  She denies any chest pain or shortness of breath.  No prior history of DVT.    MDM: Differential includes DVT, peripheral edema secondary to CHF, etc.    Patient sent for duplex ultrasound of the left leg.    Doppler ultrasound of the left leg shows no evidence of DVT.    I spoke with the patient about her work-up.  I have encouraged her to wear knee-high socks and to elevate the legs as able.  She has been advised to follow-up with her PCP or return if acutely worse.                                         MDM    Final diagnoses:   Left leg swelling       ED Disposition  ED Disposition     ED Disposition   Discharge    Condition   Stable    Comment   --             Chinmay Sarabia MD  [address] [address]  [phone]      As needed    [facility] Emergency Department  [address] [address] [phone]    If symptoms worsen         Medication List      No changes were made to your prescriptions during this visit.          Tommie Hernandez, PA  05/25/23 [id number]

## 2023-05-25 NOTE — DISCHARGE INSTRUCTIONS
Rest.  Wear knee-high support socks as able.  Elevate the legs as able.  Follow-up with your PCP or return to the emergency department if any acute concerns.

## 2023-05-26 LAB
BH CV LOWER VASCULAR LEFT COMMON FEMORAL AUGMENT: NORMAL
BH CV LOWER VASCULAR LEFT COMMON FEMORAL COMPRESS: NORMAL
BH CV LOWER VASCULAR LEFT COMMON FEMORAL PHASIC: NORMAL
BH CV LOWER VASCULAR LEFT COMMON FEMORAL SPONT: NORMAL
BH CV LOWER VASCULAR LEFT DISTAL FEMORAL AUGMENT: NORMAL
BH CV LOWER VASCULAR LEFT DISTAL FEMORAL COMPRESS: NORMAL
BH CV LOWER VASCULAR LEFT DISTAL FEMORAL PHASIC: NORMAL
BH CV LOWER VASCULAR LEFT DISTAL FEMORAL SPONT: NORMAL
BH CV LOWER VASCULAR LEFT GASTRONEMIUS COMPRESS: NORMAL
BH CV LOWER VASCULAR LEFT GREATER SAPH AK COMPRESS: NORMAL
BH CV LOWER VASCULAR LEFT GREATER SAPH BK COMPRESS: NORMAL
BH CV LOWER VASCULAR LEFT LESSER SAPH COMPRESS: NORMAL
BH CV LOWER VASCULAR LEFT MID FEMORAL AUGMENT: NORMAL
BH CV LOWER VASCULAR LEFT MID FEMORAL COMPRESS: NORMAL
BH CV LOWER VASCULAR LEFT MID FEMORAL PHASIC: NORMAL
BH CV LOWER VASCULAR LEFT MID FEMORAL SPONT: NORMAL
BH CV LOWER VASCULAR LEFT PERONEAL COMPRESS: NORMAL
BH CV LOWER VASCULAR LEFT POPLITEAL AUGMENT: NORMAL
BH CV LOWER VASCULAR LEFT POPLITEAL COMPRESS: NORMAL
BH CV LOWER VASCULAR LEFT POPLITEAL PHASIC: NORMAL
BH CV LOWER VASCULAR LEFT POPLITEAL SPONT: NORMAL
BH CV LOWER VASCULAR LEFT POSTERIOR TIBIAL COMPRESS: NORMAL
BH CV LOWER VASCULAR LEFT PROFUNDA FEMORAL AUGMENT: NORMAL
BH CV LOWER VASCULAR LEFT PROFUNDA FEMORAL PHASIC: NORMAL
BH CV LOWER VASCULAR LEFT PROFUNDA FEMORAL SPONT: NORMAL
BH CV LOWER VASCULAR LEFT PROXIMAL FEMORAL AUGMENT: NORMAL
BH CV LOWER VASCULAR LEFT PROXIMAL FEMORAL COMPRESS: NORMAL
BH CV LOWER VASCULAR LEFT PROXIMAL FEMORAL PHASIC: NORMAL
BH CV LOWER VASCULAR LEFT PROXIMAL FEMORAL SPONT: NORMAL
BH CV LOWER VASCULAR LEFT SAPHENOFEMORAL JUNCTION AUGMENT: NORMAL
BH CV LOWER VASCULAR LEFT SAPHENOFEMORAL JUNCTION COMPRESS: NORMAL
BH CV LOWER VASCULAR LEFT SAPHENOFEMORAL JUNCTION PHASIC: NORMAL
BH CV LOWER VASCULAR LEFT SAPHENOFEMORAL JUNCTION SPONT: NORMAL
BH CV LOWER VASCULAR RIGHT COMMON FEMORAL AUGMENT: NORMAL
BH CV LOWER VASCULAR RIGHT COMMON FEMORAL COMPRESS: NORMAL
BH CV LOWER VASCULAR RIGHT COMMON FEMORAL PHASIC: NORMAL
BH CV LOWER VASCULAR RIGHT COMMON FEMORAL SPONT: NORMAL
MAXIMAL PREDICTED HEART RATE: 150 BPM
STRESS TARGET HR: 128 BPM

## 2023-06-20 PROBLEM — M17.12 ARTHRITIS OF KNEE, LEFT: Status: ACTIVE | Noted: 2023-01-23

## 2023-06-20 PROBLEM — M17.12 LOCALIZED OSTEOARTHRITIS OF LEFT KNEE: Status: ACTIVE | Noted: 2023-01-23

## 2023-06-20 PROBLEM — M17.11 ARTHRITIS OF RIGHT KNEE: Status: ACTIVE | Noted: 2023-02-28

## 2023-06-20 PROBLEM — R25.1 TREMOR: Status: ACTIVE | Noted: 2018-08-15

## 2023-06-20 PROBLEM — M17.9 OSTEOARTHRITIS OF KNEE, UNSPECIFIED: Status: ACTIVE | Noted: 2022-10-27

## 2024-06-24 NOTE — PROGRESS NOTES
[facility] Cardiology    Patient ID: Cathi Walker is a 74 y.o. female.  : 1950   Contact: [phone]    Encounter date: 2024    PCP: Chinmay Sarabia MD      Chief complaint:   Chief Complaint   Patient presents with    Bilateral carotid artery stenosis    CHAMBERS (dyspnea on exertion)       Problem List:  Chest pain  Nuclear Stress Test 2014:  Normal per patient; data insufficient  Stress Echo 10/2/17:  EF 60%, trace MR/ TR.  Good exercise tolerance with borderline hypertensive response @ 200/80.  No ischemia.    Stress Echo 2020:  EF 60%, mild MR, trace TR.  Hypertensive response to exercise with a peak pressure of 202/62 mmHg.  An appropriate augmentation was seen in all myocardial segments with exercise.  No evidence of inducible ischemia by clinical, electrocardiographic or echocardiographic criteria  History of Mitral Valve Prolapse  Echocardiogram 2014: data deficit  Stress Echo 10/2/17:  EF 60%, trace MR/ TR.  Good exercise tolerance with borderline hypertensive response @ 200/80.  No ischemica.  No mention of mitral valve prolapse.   Stress Echo 2020:  Mild MR without prolapse noted.   Essential Hypertension  Hyperlipidemia  Carotid Artery Disease  Carotid Duplex 2014, Rayna:  1-49% stenosis on right; normal antegrade flow   Carotid duplex 10/2020: ABILIO 0-49%, LICA 0-49%, mild bilateral carotid atherosclerosis.   LE edema  Normal venous duplex, 2023  Thyroid Disorder  GERD  LENY with CPAP  Moderate COPD, follows Dr. Liliana William ([facility])  Chronic dyspnea  PFTs 2020 confirm mod COPD.    Surgeries:  Cholecystectomy.   Bilateral knee replacements    Allergies   Allergen Reactions    Penicillins Hives    Levofloxacin Other (See Comments)     Joint aches       Current Medications:    Current Outpatient Medications:     amLODIPine (NORVASC) 5 MG tablet, TAKE 1 TABLET BY MOUTH EVERY DAY, Disp: 90 tablet, Rfl: 3    aspirin 81 MG EC  tablet, Take 1 tablet by mouth Daily., Disp: , Rfl:     atorvastatin (LIPITOR) 80 MG tablet, Take 1 tablet by mouth Daily. Need lab work- will mail orders, Disp: 90 tablet, Rfl: 0    Calcium Carb-Cholecalciferol 600-400 MG-UNIT tablet, Calcium 600 + D(3) 600 mg-10 mcg (400 unit) tablet, Disp: , Rfl:     Cholecalciferol (VITAMIN D3) 2000 units tablet, Take 1 tablet by mouth Daily., Disp: , Rfl:     docusate sodium (COLACE) 100 MG capsule, Take 1 capsule by mouth Daily As Needed., Disp: , Rfl:     hydrochlorothiazide (HYDRODIURIL) 25 MG tablet, Take 1 tablet by mouth Daily., Disp: , Rfl: 1    levothyroxine (SYNTHROID, LEVOTHROID) 125 MCG tablet, Take 1 tablet by mouth Daily., Disp: , Rfl:     metoprolol succinate XL (TOPROL-XL) 100 MG 24 hr tablet, Take 1 tablet by mouth Daily., Disp: , Rfl: 1    multivitamins-minerals (PRESERVISION AREDS 2) capsule capsule, Take 1 capsule by mouth 2 (Two) Times a Day., Disp: , Rfl:     PARoxetine (PAXIL) 40 MG tablet, Take 0.5 tablets by mouth., Disp: , Rfl:     ramipril (ALTACE) 10 MG capsule, Take 2 capsules by mouth Daily., Disp: , Rfl: 1    Semaglutide,0.25 or 0.5MG/DOS, (OZEMPIC) 2 MG/1.5ML solution pen-injector, Inject 0.25 mg under the skin into the appropriate area as directed 1 (One) Time Per Week., Disp: , Rfl:     tiotropium bromide-olodaterol (Stiolto Respimat) 2.5-2.5 MCG/ACT aerosol solution inhaler, Inhale 2 puffs Daily., Disp: 1 each, Rfl: 11    HPI    Cathi Walker is a 74 y.o. female who presents today for a 1 year follow up of carotid stenosis, hypertension and cardiac risk factors. Since last visit, patient overall has been doing well.  She had bilateral knee replacements and is getting up and walking around quite a bit.  She did have some swelling to her leg after 1 most recently in January.  She had an elevated D-dimer as an outpatient and then a follow-up ultrasound of her legs showing no DVT.  I do not have the records of the ultrasound but this was  "reported to me by the patient.  Patient does not check her blood pressure at home as she states that it makes her nervous to check her blood pressure at home.  Most recent cholesterol is controlled.  She states she currently is on compounded semaglutide as she cannot afford the current formulation that is Ozempic or Wegovy.      The following portions of the patient's history were reviewed and updated as appropriate: allergies, current medications and problem list.    Pertinent positives as listed in the HPI.  All other systems reviewed are negative.         Vitals:    06/26/24 1313   BP: 130/62   BP Location: Left arm   Patient Position: Sitting   Pulse: 72   SpO2: 95%   Weight: 86.2 kg (190 lb)   Height: 160 cm (63\")       Physical Exam:  General: Alert and oriented.  Neck: Jugular venous pressure is within normal limits. Carotids have normal upstrokes without bruits.   Cardiovascular: Heart has a nondisplaced focal PMI. Regular rate and rhythm. No murmur, gallop or rub.  Lungs: Clear, no rales or wheezes. Equal expansion is noted.   Extremities: Show no edema.  Skin: Warm and dry.  Neurologic: Nonfocal.     Diagnostic Data (reviewed with patient):  No recent laboratory studies available for review today.    Advance Care Planning   ACP discussion was declined by the patient. Patient does not have an advance directive, declines further assistance.         Assessment:    ICD-10-CM ICD-9-CM   1. MVP (mitral valve prolapse)  I34.1 424.0   2. Bilateral carotid artery stenosis  I65.23 433.10     433.30   3. Essential hypertension  I10 401.9   4. Mixed hyperlipidemia  E78.2 272.2         Plan:  Stable cardiac status.  Discussed with the patient that I do not hear a bruit or murmur.  Will reevaluate in the future for need of a carotid duplex and/or echocardiogram.  Continue on amlodipine 5 mg daily, hydrochlorothiazide 25 mg daily, ramipril 20 mg daily, and metoprolol succinate 100 mg daily for hypertension.   Continue on " aspirin 81 mg for antiplatelet therapy.   Continue on atorvastatin 80 mg daily for hyperlipidemia as her cholesterol is controlled and checked by her PCP..   Continue all other current medications.  F/up in 12 months, sooner if needed.    Do Baker PA-C

## 2024-06-26 ENCOUNTER — OFFICE VISIT (OUTPATIENT)
Dept: CARDIOLOGY | Facility: CLINIC | Age: 74
End: 2024-06-26
Payer: MEDICARE

## 2024-06-26 VITALS
WEIGHT: 190 LBS | OXYGEN SATURATION: 95 % | SYSTOLIC BLOOD PRESSURE: 130 MMHG | HEIGHT: 63 IN | HEART RATE: 72 BPM | BODY MASS INDEX: 33.66 KG/M2 | DIASTOLIC BLOOD PRESSURE: 62 MMHG

## 2024-06-26 DIAGNOSIS — I10 ESSENTIAL HYPERTENSION: ICD-10-CM

## 2024-06-26 DIAGNOSIS — I34.1 MVP (MITRAL VALVE PROLAPSE): Primary | ICD-10-CM

## 2024-06-26 DIAGNOSIS — E78.2 MIXED HYPERLIPIDEMIA: ICD-10-CM

## 2024-06-26 DIAGNOSIS — I65.23 BILATERAL CAROTID ARTERY STENOSIS: ICD-10-CM

## 2024-06-26 PROCEDURE — 3075F SYST BP GE 130 - 139MM HG: CPT | Performed by: PHYSICIAN ASSISTANT

## 2024-06-26 PROCEDURE — 99214 OFFICE O/P EST MOD 30 MIN: CPT | Performed by: PHYSICIAN ASSISTANT

## 2024-06-26 PROCEDURE — 1160F RVW MEDS BY RX/DR IN RCRD: CPT | Performed by: PHYSICIAN ASSISTANT

## 2024-06-26 PROCEDURE — 1159F MED LIST DOCD IN RCRD: CPT | Performed by: PHYSICIAN ASSISTANT

## 2024-06-26 PROCEDURE — 3078F DIAST BP <80 MM HG: CPT | Performed by: PHYSICIAN ASSISTANT

## 2024-07-23 ENCOUNTER — TRANSCRIBE ORDERS (OUTPATIENT)
Dept: ADMINISTRATIVE | Facility: HOSPITAL | Age: 74
End: 2024-07-23
Payer: MEDICARE

## 2024-07-23 DIAGNOSIS — Z12.31 BREAST CANCER SCREENING BY MAMMOGRAM: Primary | ICD-10-CM

## 2024-08-23 ENCOUNTER — TELEPHONE (OUTPATIENT)
Dept: CARDIOLOGY | Facility: CLINIC | Age: 74
End: 2024-08-23
Payer: MEDICARE

## 2024-08-23 NOTE — TELEPHONE ENCOUNTER
Spoke with patient.  She states she has no problems.  Her B/P has been normal.  She is encouraged to call with any questions or concerns.

## 2024-09-08 LAB
NCCN CRITERIA FLAG: ABNORMAL
TYRER CUZICK SCORE: 6.3

## 2024-09-23 ENCOUNTER — HOSPITAL ENCOUNTER (OUTPATIENT)
Dept: MAMMOGRAPHY | Facility: HOSPITAL | Age: 74
Discharge: HOME | End: 2024-09-23
Admitting: FAMILY MEDICINE
Payer: MEDICARE

## 2024-09-23 DIAGNOSIS — Z12.31 BREAST CANCER SCREENING BY MAMMOGRAM: ICD-10-CM

## 2024-09-23 PROCEDURE — 77063 BREAST TOMOSYNTHESIS BI: CPT

## 2024-09-23 PROCEDURE — 77067 SCR MAMMO BI INCL CAD: CPT

## 2025-04-14 ENCOUNTER — OFFICE VISIT (OUTPATIENT)
Dept: PULMONOLOGY | Facility: CLINIC | Age: 75
End: 2025-04-14
Payer: MEDICARE

## 2025-04-14 VITALS
HEART RATE: 65 BPM | SYSTOLIC BLOOD PRESSURE: 126 MMHG | HEIGHT: 63 IN | OXYGEN SATURATION: 100 % | BODY MASS INDEX: 33.66 KG/M2 | TEMPERATURE: 97.9 F | DIASTOLIC BLOOD PRESSURE: 60 MMHG

## 2025-04-14 DIAGNOSIS — G47.33 OSA (OBSTRUCTIVE SLEEP APNEA): ICD-10-CM

## 2025-04-14 DIAGNOSIS — J44.9 STAGE 2 MODERATE COPD BY GOLD CLASSIFICATION: Primary | ICD-10-CM

## 2025-04-14 PROCEDURE — 94726 PLETHYSMOGRAPHY LUNG VOLUMES: CPT | Performed by: INTERNAL MEDICINE

## 2025-04-14 PROCEDURE — 94375 RESPIRATORY FLOW VOLUME LOOP: CPT | Performed by: INTERNAL MEDICINE

## 2025-04-14 PROCEDURE — 94729 DIFFUSING CAPACITY: CPT | Performed by: INTERNAL MEDICINE

## 2025-04-14 PROCEDURE — 99214 OFFICE O/P EST MOD 30 MIN: CPT | Performed by: INTERNAL MEDICINE

## 2025-04-14 PROCEDURE — 3074F SYST BP LT 130 MM HG: CPT | Performed by: INTERNAL MEDICINE

## 2025-04-14 PROCEDURE — 3078F DIAST BP <80 MM HG: CPT | Performed by: INTERNAL MEDICINE

## 2025-04-14 NOTE — PROGRESS NOTES
"Pulmonary Office Follow Up      Subjective   Chief Complaint: Shortness of Breath    Cathi Walker is a 74 y.o. female is being seen in follow up for COPD.     History of Present Illness    Ms. Walker is a 75yo F who is followed for COPD. She was last seen in clinic on 1/5/23.     She returns today for follow up. She had both knees replaced since her last visit. She has not had any breathing problems.     She notes that she needs a new Cpap machine. She received a replacement as part of the [employer] recall but reports that it is not working correctly.     The following portions of the patient's history were reviewed and updated as appropriate: allergies, current medications, past family history, past medical history, past social history, past surgical history and problem list.    Review of Systems  All other systems were reviewed and are negative.  Exceptions are noted in the HPI or above.      Objective   Blood pressure 126/60, pulse 65, temperature 97.9 °F (36.6 °C), height 160 cm (63\"), SpO2 100%, not currently breastfeeding.  Physical Exam  Vitals and nursing note reviewed.   Constitutional:       General: She is not in acute distress.     Appearance: She is well-developed.   HENT:      Head: Normocephalic and atraumatic.   Eyes:      General: No scleral icterus.     Conjunctiva/sclera: Conjunctivae normal.      Pupils: Pupils are equal, round, and reactive to light.   Neck:      Thyroid: No thyromegaly.      Trachea: No tracheal deviation.   Cardiovascular:      Rate and Rhythm: Normal rate and regular rhythm.      Heart sounds: Normal heart sounds.   Pulmonary:      Effort: Pulmonary effort is normal. No respiratory distress.      Breath sounds: Normal breath sounds.   Abdominal:      General: Bowel sounds are normal.      Palpations: Abdomen is soft.      Tenderness: There is no abdominal tenderness.   Musculoskeletal:         General: Normal range of motion.      Cervical back: Normal range of motion " and neck supple.   Lymphadenopathy:      Cervical: No cervical adenopathy.   Skin:     General: Skin is warm and dry.      Findings: No erythema or rash.   Neurological:      Mental Status: She is alert and oriented to person, place, and time.      Motor: No abnormal muscle tone.      Coordination: Coordination normal.   Psychiatric:         Speech: Speech normal.         Behavior: Behavior normal.         Judgment: Judgment normal.         PFTs:  Performed in clinic and personally reviewed.   There is no airway obstruction.  The lung volumes are normal.  The DLCO is normal.     Imaging:  Chest xray performed today. This is a PA/Lateral film. The cardiac and mediastinal contours are within normal limits. The lungs are grossly clear bilaterally. There is no pneumothorax or pleural effusion.     Impression: No acute cardiopulmonary process. No significant change when compared to 1/5/23.      Assessment & Plan   Diagnoses and all orders for this visit:    1. Stage 2 moderate COPD by GOLD classification (Primary)  -     XR Chest PA & Lateral  -     Spirometry with Diffusion Capacity & Lung Volumes    2. LENY (obstructive sleep apnea)        Discussion:  Ms. Walker is a 75yo F who is followed for COPD.      1. Moderate COPD  - PFTs with evidence of moderate airway obstruction previously. No airway obstruction present today.   - Not currently using any inhaler therapy. Sample of Spiriva given. Instructed to start using if she develops bronchitis.     - PRN Albuterol as needed.   - Alpha-1-kit was abnormal with a genotype of M/I. Alpha-1 level was normal.  - No history of recurrent bronchitis.      2. Exercise Induced Hypoxemia  - Resolved with weight loss and inhaler therapy.      3. Former Smoker  - Quit in 2000  - Does not qualify for lung cancer screening.      4. LENY  - We will try and arrange for her to get a new Cpap machine.   - She reports that she can get a copy of her sleep study if needed.     Follow up in 1  year. No PFTs needed at her next visit.        Cathi Walker  reports that she quit smoking about 25 years ago. Her smoking use included cigarettes. She started smoking about 55 years ago. She has a 30 pack-year smoking history. She has never used smokeless tobacco.     Liliana William, DO  Pulmonary and Critical Care Medicine  Note Electronically Signed

## 2025-07-02 ENCOUNTER — OFFICE VISIT (OUTPATIENT)
Dept: CARDIOLOGY | Facility: CLINIC | Age: 75
End: 2025-07-02
Payer: MEDICARE

## 2025-07-02 VITALS
HEART RATE: 64 BPM | HEIGHT: 63 IN | BODY MASS INDEX: 30.76 KG/M2 | SYSTOLIC BLOOD PRESSURE: 132 MMHG | DIASTOLIC BLOOD PRESSURE: 80 MMHG | WEIGHT: 173.6 LBS | OXYGEN SATURATION: 97 %

## 2025-07-02 DIAGNOSIS — I77.9 MILD CAROTID ARTERY DISEASE: ICD-10-CM

## 2025-07-02 DIAGNOSIS — E78.2 MIXED HYPERLIPIDEMIA: ICD-10-CM

## 2025-07-02 DIAGNOSIS — I34.0 MITRAL VALVE INSUFFICIENCY, UNSPECIFIED ETIOLOGY: ICD-10-CM

## 2025-07-02 DIAGNOSIS — I10 ESSENTIAL HYPERTENSION: ICD-10-CM

## 2025-07-02 DIAGNOSIS — R00.2 PALPITATIONS: Primary | ICD-10-CM

## 2025-07-02 PROCEDURE — 3079F DIAST BP 80-89 MM HG: CPT | Performed by: PHYSICIAN ASSISTANT

## 2025-07-02 PROCEDURE — 99214 OFFICE O/P EST MOD 30 MIN: CPT | Performed by: PHYSICIAN ASSISTANT

## 2025-07-02 PROCEDURE — 93000 ELECTROCARDIOGRAM COMPLETE: CPT | Performed by: PHYSICIAN ASSISTANT

## 2025-07-02 PROCEDURE — 3075F SYST BP GE 130 - 139MM HG: CPT | Performed by: PHYSICIAN ASSISTANT

## 2025-07-02 RX ORDER — LEVOTHYROXINE SODIUM 112 UG/1
112 TABLET ORAL
COMMUNITY
Start: 2025-05-14

## 2025-07-02 NOTE — PROGRESS NOTES
[facility]    Patient ID: Cathi Walker is a 75 y.o. female.  : 1950   Contact: [phone]    Encounter date: 2025    PCP: Chinmay Sarabia MD      Chief complaint:   Chief Complaint   Patient presents with    Mitral Valve Prolapse       Problem List:  Chest pain  Nuclear Stress Test 2014:  Normal per patient; data insufficient  Stress Echo 10/2/17:  EF 60%, trace MR/ TR.  Good exercise tolerance with borderline hypertensive response @ 200/80.  No ischemia.    Stress Echo 2020:  EF 60%, mild MR, trace TR.  Hypertensive response to exercise with a peak pressure of 202/62 mmHg.  An appropriate augmentation was seen in all myocardial segments with exercise.  No evidence of inducible ischemia by clinical, electrocardiographic or echocardiographic criteria  History of Mitral Valve Prolapse  Echocardiogram 2014: data deficit  Stress Echo 10/2/17:  EF 60%, trace MR/ TR.  Good exercise tolerance with borderline hypertensive response @ 200/80.  No ischemica.  No mention of mitral valve prolapse.   Stress Echo 2020:  Mild MR without prolapse noted.   Essential Hypertension  Hyperlipidemia  Carotid Artery Disease  Carotid Duplex , Rayna:  1-49% stenosis on right; normal antegrade flow   Carotid duplex 10/2020: ABILIO 0-49%, LICA 0-49%, mild bilateral carotid atherosclerosis.   LE edema  Normal venous duplex, 2023  Thyroid Disorder  GERD  LENY with CPAP  Moderate COPD, follows Dr. Liliana William ([facility])  Chronic dyspnea  PFTs 2020 confirm mod COPD.    Surgeries:  Cholecystectomy.   Bilateral knee replacements    Allergies   Allergen Reactions    Penicillins Hives    Levofloxacin Other (See Comments)     Joint aches       Current Medications:    Current Outpatient Medications:     levothyroxine (SYNTHROID, LEVOTHROID) 112 MCG tablet, Take 1 tablet by mouth Every Morning., Disp: , Rfl:     linaclotide (Linzess) 290 MCG capsule capsule,  Take 1 capsule by mouth Every Morning Before Breakfast., Disp: , Rfl:     ramipril (ALTACE) 10 MG capsule, Take 2 capsules by mouth Daily. (Patient taking differently: Take 1 capsule by mouth Daily.), Disp: , Rfl: 1    tiotropium bromide-olodaterol (Stiolto Respimat) 2.5-2.5 MCG/ACT aerosol solution inhaler, Inhale 2 puffs Daily. (Patient taking differently: Inhale 2 puffs As Needed.), Disp: 1 each, Rfl: 11    amLODIPine (NORVASC) 5 MG tablet, TAKE 1 TABLET BY MOUTH EVERY DAY, Disp: 90 tablet, Rfl: 3    aspirin 81 MG EC tablet, Take 1 tablet by mouth Daily., Disp: , Rfl:     atorvastatin (LIPITOR) 80 MG tablet, Take 1 tablet by mouth Daily. Need lab work- will mail orders, Disp: 90 tablet, Rfl: 0    Calcium Carb-Cholecalciferol 600-400 MG-UNIT tablet, Calcium 600 + D(3) 600 mg-10 mcg (400 unit) tablet, Disp: , Rfl:     Cholecalciferol (VITAMIN D3) 2000 units tablet, Take 1 tablet by mouth Daily., Disp: , Rfl:     hydrochlorothiazide (HYDRODIURIL) 25 MG tablet, Take 1 tablet by mouth Daily., Disp: , Rfl: 1    metoprolol succinate XL (TOPROL-XL) 100 MG 24 hr tablet, Take 1 tablet by mouth Daily., Disp: , Rfl: 1    multivitamins-minerals (PRESERVISION AREDS 2) capsule capsule, Take 1 capsule by mouth 2 (Two) Times a Day., Disp: , Rfl:     PARoxetine (PAXIL) 40 MG tablet, Take 0.5 tablets by mouth., Disp: , Rfl:     HPI    Cathi Walker is a 75 y.o. female who presents today for a follow up of mitral regurgitation, bilateral carotid artery stenosis, and cardiac risk factors. Since last visit, patient states that she has been having intermittent palpitations.  She states that she is noticed them at least once a week.  They are very brief in nature.  She has not noticed any triggering events.  Patient stays fairly active with walking without any exertional chest pain or lower extremity swelling.  Blood pressure has been well-controlled.      The following portions of the patient's history were reviewed and updated as  "appropriate: allergies, current medications and problem list.    Pertinent positives as listed in the HPI.  All other systems reviewed are negative.         Vitals:    07/02/25 1114   BP: 132/80   BP Location: Left arm   Patient Position: Sitting   Cuff Size: Adult   Pulse: 64   SpO2: 97%   Weight: 78.7 kg (173 lb 9.6 oz)   Height: 160 cm (63\")       Physical Exam:  General: Alert and no acute distress.  Neck: Jugular venous pressure is within normal limits. Carotids have normal upstrokes without bruits.   Cardiovascular: Regular rate and rhythm. No murmur, gallop or rub.  Lungs: Clear, no rales or wheezes. Equal expansion is noted.   Extremities: Show no edema.  Skin: Warm and dry.  Neurologic: Nonfocal.     Diagnostic Data (reviewed with patient):  Lab date: 01/30/2024  FLP: , , HDL 55, LDL 57  CMP: Glu 117, BUN 24, Creat 0.94, eGFR 59, Na 143, K 4.8, Cl 101, CO2 29, Ca 9.8, Alk Phos 112, AST 34, ALT 35  CBC: WBC 7.9, RBC 4.71, HGB 13.6, HCT 41.9, MCV 89, MCH 28.9,       ECG 12 Lead    Date/Time: 7/2/2025 11:41 AM  Performed by: Do Baker PA-C    Authorized by: Do Baker PA-C  Comparison: compared with previous ECG from 6/20/2023  Rhythm: sinus rhythm  BPM: 64    Clinical impression: normal ECG        Advance Care Planning   ACP discussion was declined by the patient. Patient does not have an advance directive, declines further assistance.               Assessment:    ICD-10-CM ICD-9-CM   1. Palpitations  R00.2 785.1   2. Bilateral carotid artery stenosis  I65.23 433.10     433.30   3. Essential hypertension  I10 401.9   4. Mixed hyperlipidemia  E78.2 272.2   5. Mitral valve insufficiency, unspecified etiology  I34.0 424.0         Plan:  Patient is having palpitations, EKG today showing no arrhythmia or ectopy.  At this time I discussed with patient if she starts to have her symptoms more frequently she can contact us to have a Holter monitor placed.  Continue amlodipine 5 mg daily, " hydrochlorothiazide 25 mg daily, and ramipril 10 mg daily for hypertension  Continue aspirin 81 mg for antiplatelet therapy  Continue atorvastatin 80 mg nightly for hyperlipidemia.  LDL is 53 and to goal.  F/up in 12 months, sooner if needed.      Do Baker PA-C